# Patient Record
Sex: FEMALE | Race: WHITE | NOT HISPANIC OR LATINO | Employment: OTHER | URBAN - METROPOLITAN AREA
[De-identification: names, ages, dates, MRNs, and addresses within clinical notes are randomized per-mention and may not be internally consistent; named-entity substitution may affect disease eponyms.]

---

## 2018-11-08 ENCOUNTER — HOSPITAL ENCOUNTER (OUTPATIENT)
Dept: GENERAL RADIOLOGY | Facility: HOSPITAL | Age: 68
Discharge: HOME | End: 2018-11-08
Attending: PHYSICAL MEDICINE & REHABILITATION | Admitting: PHYSICAL MEDICINE & REHABILITATION

## 2019-01-07 ENCOUNTER — OFFICE (AMBULATORY)
Dept: URBAN - METROPOLITAN AREA CLINIC 64 | Facility: CLINIC | Age: 69
End: 2019-01-07
Payer: MEDICARE

## 2019-01-07 VITALS
HEART RATE: 71 BPM | HEIGHT: 63 IN | SYSTOLIC BLOOD PRESSURE: 120 MMHG | DIASTOLIC BLOOD PRESSURE: 70 MMHG | WEIGHT: 145 LBS

## 2019-01-07 DIAGNOSIS — K30 FUNCTIONAL DYSPEPSIA: ICD-10-CM

## 2019-01-07 DIAGNOSIS — R13.10 DYSPHAGIA, UNSPECIFIED: ICD-10-CM

## 2019-01-07 DIAGNOSIS — Z79.02 LONG TERM (CURRENT) USE OF ANTITHROMBOTICS/ANTIPLATELETS: ICD-10-CM

## 2019-01-07 PROCEDURE — 99244 OFF/OP CNSLTJ NEW/EST MOD 40: CPT | Performed by: NURSE PRACTITIONER

## 2019-01-07 PROCEDURE — 99204 OFFICE O/P NEW MOD 45 MIN: CPT | Performed by: NURSE PRACTITIONER

## 2019-01-10 ENCOUNTER — HOSPITAL ENCOUNTER (OUTPATIENT)
Dept: PAIN MEDICINE | Facility: HOSPITAL | Age: 69
Discharge: HOME | End: 2019-01-10
Attending: ANESTHESIOLOGY | Admitting: ANESTHESIOLOGY

## 2019-01-10 LAB
AMPHETAMINES UR QL SCN: NEGATIVE
BARBITURATES UR QL SCN: NEGATIVE
BENZODIAZ UR QL SCN: NEGATIVE
BZE UR QL SCN: NEGATIVE
CREAT 24H UR-MCNC: NORMAL MG/DL
METHADONE UR QL SCN: NEGATIVE
OPIATE CONFIRMATION URINE: NORMAL
OPIATES TESTED UR SCN: NEGATIVE
PCP UR QL: NEGATIVE
THC SERPLBLD CFM-MCNC: NEGATIVE NG/ML

## 2019-01-24 ENCOUNTER — HOSPITAL ENCOUNTER (OUTPATIENT)
Dept: PAIN MEDICINE | Facility: HOSPITAL | Age: 69
Discharge: HOME | End: 2019-01-24
Attending: ANESTHESIOLOGY | Admitting: ANESTHESIOLOGY

## 2019-02-26 ENCOUNTER — HOSPITAL ENCOUNTER (OUTPATIENT)
Dept: GENERAL RADIOLOGY | Facility: HOSPITAL | Age: 69
Discharge: HOME | End: 2019-02-26
Attending: ANESTHESIOLOGY | Admitting: ANESTHESIOLOGY

## 2019-03-19 ENCOUNTER — ON CAMPUS - OUTPATIENT (AMBULATORY)
Dept: URBAN - METROPOLITAN AREA HOSPITAL 85 | Facility: HOSPITAL | Age: 69
End: 2019-03-19
Payer: MEDICARE

## 2019-03-19 ENCOUNTER — HOSPITAL ENCOUNTER (OUTPATIENT)
Dept: GASTROENTEROLOGY | Facility: HOSPITAL | Age: 69
Setting detail: HOSPITAL OUTPATIENT SURGERY
Discharge: HOME | End: 2019-03-19
Attending: INTERNAL MEDICINE | Admitting: INTERNAL MEDICINE

## 2019-03-19 DIAGNOSIS — R13.10 DYSPHAGIA, UNSPECIFIED: ICD-10-CM

## 2019-03-19 DIAGNOSIS — K29.50 UNSPECIFIED CHRONIC GASTRITIS WITHOUT BLEEDING: ICD-10-CM

## 2019-03-19 DIAGNOSIS — B96.81 HELICOBACTER PYLORI [H. PYLORI] AS THE CAUSE OF DISEASES CLA: ICD-10-CM

## 2019-03-19 DIAGNOSIS — R63.4 ABNORMAL WEIGHT LOSS: ICD-10-CM

## 2019-03-19 LAB — GLUCOSE BLD-MCNC: 98 MG/DL (ref 70–105)

## 2019-03-19 PROCEDURE — 43239 EGD BIOPSY SINGLE/MULTIPLE: CPT | Performed by: INTERNAL MEDICINE

## 2019-03-19 PROCEDURE — 43450 DILATE ESOPHAGUS 1/MULT PASS: CPT | Performed by: INTERNAL MEDICINE

## 2019-03-21 ENCOUNTER — HOSPITAL ENCOUNTER (OUTPATIENT)
Dept: PAIN MEDICINE | Facility: HOSPITAL | Age: 69
Discharge: HOME | End: 2019-03-21
Attending: ANESTHESIOLOGY | Admitting: ANESTHESIOLOGY

## 2019-03-21 LAB
AMPHETAMINES UR QL SCN: NEGATIVE
BARBITURATES UR QL SCN: ABNORMAL
BENZODIAZ UR QL SCN: NEGATIVE
BZE UR QL SCN: NEGATIVE
CREAT 24H UR-MCNC: ABNORMAL MG/DL
METHADONE UR QL SCN: NEGATIVE
OPIATE CONFIRMATION URINE: ABNORMAL
OPIATES TESTED UR SCN: NEGATIVE
PCP UR QL: NEGATIVE
THC SERPLBLD CFM-MCNC: NEGATIVE NG/ML

## 2019-05-21 ENCOUNTER — HOSPITAL ENCOUNTER (OUTPATIENT)
Dept: PAIN MEDICINE | Facility: HOSPITAL | Age: 69
Discharge: HOME | End: 2019-05-21
Attending: ANESTHESIOLOGY | Admitting: ANESTHESIOLOGY

## 2019-07-18 ENCOUNTER — OFFICE VISIT (OUTPATIENT)
Dept: PAIN MEDICINE | Facility: CLINIC | Age: 69
End: 2019-07-18

## 2019-07-18 VITALS
SYSTOLIC BLOOD PRESSURE: 146 MMHG | RESPIRATION RATE: 16 BRPM | TEMPERATURE: 98.6 F | BODY MASS INDEX: 25.34 KG/M2 | DIASTOLIC BLOOD PRESSURE: 74 MMHG | HEIGHT: 63 IN | WEIGHT: 143 LBS | HEART RATE: 67 BPM | OXYGEN SATURATION: 99 %

## 2019-07-18 DIAGNOSIS — M25.50 POLYARTHRALGIA: ICD-10-CM

## 2019-07-18 DIAGNOSIS — F19.90 CURRENT DRUG USE: Primary | ICD-10-CM

## 2019-07-18 DIAGNOSIS — M79.18 MYOFASCIAL PAIN SYNDROME: ICD-10-CM

## 2019-07-18 DIAGNOSIS — Z79.899 HIGH RISK MEDICATION USE: ICD-10-CM

## 2019-07-18 DIAGNOSIS — G89.4 CHRONIC PAIN SYNDROME: Primary | ICD-10-CM

## 2019-07-18 DIAGNOSIS — M96.1 POSTLAMINECTOMY SYNDROME OF LUMBAR REGION: ICD-10-CM

## 2019-07-18 PROCEDURE — G0463 HOSPITAL OUTPT CLINIC VISIT: HCPCS | Performed by: ANESTHESIOLOGY

## 2019-07-18 PROCEDURE — 99214 OFFICE O/P EST MOD 30 MIN: CPT | Performed by: ANESTHESIOLOGY

## 2019-07-18 RX ORDER — VENLAFAXINE HYDROCHLORIDE 75 MG/1
75 CAPSULE, EXTENDED RELEASE ORAL EVERY MORNING
Refills: 0 | COMMUNITY
Start: 2019-06-27

## 2019-07-18 RX ORDER — PROMETHAZINE HYDROCHLORIDE 25 MG/1
TABLET ORAL
COMMUNITY
Start: 2013-10-10 | End: 2019-10-17 | Stop reason: ALTCHOICE

## 2019-07-18 RX ORDER — HYDROCODONE BITARTRATE AND ACETAMINOPHEN 7.5; 325 MG/1; MG/1
1 TABLET ORAL 2 TIMES DAILY PRN
Qty: 60 TABLET | Refills: 0 | Status: SHIPPED | OUTPATIENT
Start: 2019-07-18 | End: 2019-07-18 | Stop reason: SDUPTHER

## 2019-07-18 RX ORDER — AMPICILLIN TRIHYDRATE 250 MG
1 CAPSULE ORAL EVERY 24 HOURS
COMMUNITY
Start: 2019-01-10

## 2019-07-18 RX ORDER — LEVOTHYROXINE SODIUM 0.12 MG/1
125 TABLET ORAL DAILY
COMMUNITY
End: 2019-10-17 | Stop reason: ALTCHOICE

## 2019-07-18 RX ORDER — HYDROCODONE BITARTRATE AND ACETAMINOPHEN 7.5; 325 MG/1; MG/1
TABLET ORAL
Refills: 0 | COMMUNITY
Start: 2019-06-21 | End: 2019-07-18 | Stop reason: SDUPTHER

## 2019-07-18 RX ORDER — CLOPIDOGREL BISULFATE 75 MG/1
75 TABLET ORAL DAILY
COMMUNITY
Start: 2019-01-10

## 2019-07-18 RX ORDER — GALANTAMINE HYDROBROMIDE 4 MG/1
4 TABLET, FILM COATED ORAL 2 TIMES DAILY
Refills: 0 | COMMUNITY
Start: 2019-05-30 | End: 2021-06-10

## 2019-07-18 RX ORDER — ERGOCALCIFEROL (VITAMIN D2) 10 MCG
1 TABLET ORAL DAILY
COMMUNITY
Start: 2019-01-10 | End: 2022-04-14

## 2019-07-18 RX ORDER — GABAPENTIN 300 MG/1
CAPSULE ORAL
Refills: 3 | COMMUNITY
Start: 2019-05-13 | End: 2019-10-17 | Stop reason: ALTCHOICE

## 2019-07-18 RX ORDER — TOPIRAMATE 100 MG/1
100 TABLET, FILM COATED ORAL
COMMUNITY
Start: 2013-10-10 | End: 2022-04-14

## 2019-07-18 RX ORDER — HYDROCODONE BITARTRATE AND ACETAMINOPHEN 7.5; 325 MG/1; MG/1
1 TABLET ORAL 2 TIMES DAILY PRN
Qty: 60 TABLET | Refills: 0 | Status: SHIPPED | OUTPATIENT
Start: 2019-07-18 | End: 2019-10-17 | Stop reason: SDUPTHER

## 2019-07-18 RX ORDER — METFORMIN HYDROCHLORIDE EXTENDED-RELEASE TABLETS 1000 MG/1
TABLET, FILM COATED, EXTENDED RELEASE ORAL
COMMUNITY
Start: 2019-01-10 | End: 2019-10-17 | Stop reason: ALTCHOICE

## 2019-07-18 RX ORDER — BUTALBITAL, ACETAMINOPHEN AND CAFFEINE 50; 325; 40 MG/1; MG/1; MG/1
CAPSULE ORAL
Refills: 2 | COMMUNITY
Start: 2019-06-19

## 2019-07-18 RX ORDER — LAMOTRIGINE 100 MG/1
100 TABLET ORAL 2 TIMES DAILY
Refills: 0 | COMMUNITY
Start: 2019-06-27

## 2019-07-18 RX ORDER — EVOLOCUMAB 140 MG/ML
140 INJECTION, SOLUTION SUBCUTANEOUS
COMMUNITY
Start: 2019-04-11

## 2019-07-18 RX ORDER — TRAZODONE HYDROCHLORIDE 100 MG/1
TABLET ORAL
Refills: 0 | COMMUNITY
Start: 2019-06-27 | End: 2022-04-14

## 2019-07-18 NOTE — PROGRESS NOTES
CC Bilateral  knee pain and  all over pain,    68-year-old female with multiple comorbidity including several TIAs/ CVAs, on Plavix, polyarthralgia, chronic right shoulder pain, back pain S/P L4-L5 fusion 30 years ago, here for f/u.   Recent diagnosis of dementia, completed neuropsych evaluation is follow-up next week.  Hydrocodone changed to twice daily continue to have good relief of functional benefits.  Chronic  polyarthralgia especially right shoulder pain, chronic back pain, generalized myofascial pain worse with any activity.  Denies new weakness saddle anesthesia bladder bowel incontinence.    Chronic pain interfering with her participation in physical therapy / rehab after stroke.    Seen Ortho for right shoulder had steroid injection without relief, not surgical candidate.   continued chronic migraine sees  neurology/ taking Fioricet daily.       Utilizes hydrocodone diclofenac with good relief. Denies side effects     right shoulder MRI partial thickness defect on supraspinatus and unchange from prior studies.  Degenerative changes  L-spine Xray 2019: Degenerative changes and postsurgical changes. No acute fracture or dislocation.    Pain Assessment   Location of Pain: R Shoulder, Upper Back, Lower Back, R Hip, L Hip, R Leg, L Leg, R Knee, L Knee  Description of Pain: Dull/Aching, Throbbing, Stabbing  Previous Pain Rating : 5  Current Pain Ratin  Aggravating Factors: Activity  Alleviating Factors: Rest, Medication  Previous Treatments: Narcotic Pain Medication, Physical Therapy  Previous Diagnostic Studies: X-Ray      Past Medical History:     Diabetes, Type 2     Hyperlipidemia     Hypothyroidism     ADD     julisa- cpap     anxciety     arthritis     TIA     Migraine HA    Past Surgical History:     Reviewed :        back surgery- fusion L4-5        Carpal tunnel rt hand        tubal         cryo surgery rt eye        EGD 3/19/19    Social History     Tobacco Use   • Smoking status: Not on file  "  Substance Use Topics   • Alcohol use: Not on file       Review of Systems     General       Denies fever/chills, fatigue and sleep problems.    Eyes       Denies blurry vision and double vision.    ENT       Denies decreased hearing, sore throat and ears ringing.    CV       Denies chest pain and fainting.    Resp       Denies shortness of breath and cough.    GI       Denies heartburn, constipation, nausea, vomiting and diarrhea.           Denies pain on urination, incontinence and increased frequency.    MS         Right shoulder pain, multiple joint pain, back pain    Derm       Denies rash and itching.    Neuro       Denies numbness, tingling, loss of balance and history of seizures.    Psych       Denies anxiety and depression.    Endo       Denies weight change and thirsty all the time.    Heme       Denies easy bruising, bleeding and enlarged lymph nodes.    Vitals:    07/18/19 1037   BP: 146/74   Pulse: 67   Resp: 16   Temp: 98.6 °F (37 °C)   SpO2: 99%   Weight: 64.9 kg (143 lb)   Height: 160 cm (63\")       Physical Exam   General  General appearance:  no acute distress  Gait and station: antalgic  Comment: walker    Mental Status Exam   Mental Status: AAO x3  Behavior: Appropriate    Cervical   ROM decreased w/ lateral rotation  Spurling's Test Negative  Palpation: Tender  Paracervical    Thoracolumbar   ROM: decreased rotation/extension  Juan David's Test: Negative  Straight Leg Raise: Negative  Radiculopathy: Right  Palpation: Tender  Facets, Paravertebral    Muscle Stretch Reflex   Sensory Intact to light touch/pin prick    Neuro   Reflexes: R Arm 2+  L arm 2+  R Leg 2+  L Leg 2+    Strength: Arms Normal  Strength: Legs Normal      Eyes:      Clear conjunctivae,      Pupils rounds and reactive  ENT:      Clear oropharynx,       Mucosa moist/pink       Nose: no deformity  Chest Wall:      Non tender      No deformities or masses noted.    Respiratory:      Clear bilaterally to auscultation.        No " dyspnea  Heart:      Regular rate and rhythm, no murmurs, rubs, or gallops   Pulses:      Pulses 2+, symmetric  Extremities:      No clubbing, cyanosis, edema, or deformity noted   Neurologic:      No focal deficits      Coordination intact with rapid alternating movement.  Skin:      Intact without lesions or rashes.  No mass  Cervical Nodes:      No adenopathy noted.      Global pain  scale and PHQ-9 on chart.                                  Opioid risk tool low risk      Assessment /Plan  Teri was seen today for back pain and pain.    Diagnoses and all orders for this visit:    Chronic pain syndrome    Postlaminectomy syndrome of lumbar region    Polyarthralgia    High risk medication use    Myofascial pain syndrome    Other orders  -     Discontinue: HYDROcodone-acetaminophen (NORCO) 7.5-325 MG per tablet; Take 1 tablet by mouth 2 (Two) Times a Day As Needed for Moderate Pain .  -     Discontinue: HYDROcodone-acetaminophen (NORCO) 7.5-325 MG per tablet; Take 1 tablet by mouth 2 (Two) Times a Day As Needed for Moderate Pain .  -     HYDROcodone-acetaminophen (NORCO) 7.5-325 MG per tablet; Take 1 tablet by mouth 2 (Two) Times a Day As Needed for Moderate Pain .      Summary   68-year-old female with multiple comorbidity including several TIAs/ CVAs, on Plavix, polyarthralgia, chronic right shoulder pain, back pain status post L4-L5 fusion 30 years ago, here for f/u.   Chronic polyarthralgia/right shoulder pain, chronic back pain and generalized myofascial pain.     chronic migraine on Fioricet, seeing neuro- headache next month      Cont.  hydrocodone 7.5/325 2 times daily as needed for severe pain.  UDS and  Inspect reviewed.   Discussed risk of tolerance, dependence, respiratory depression, coma and death associated with use of oral opioids for treatment of chronic nonmalignant pain.        RTC in 3 mo       No

## 2019-09-03 ENCOUNTER — TELEPHONE (OUTPATIENT)
Dept: PAIN MEDICINE | Facility: CLINIC | Age: 69
End: 2019-09-03

## 2019-10-17 ENCOUNTER — OFFICE VISIT (OUTPATIENT)
Dept: PAIN MEDICINE | Facility: CLINIC | Age: 69
End: 2019-10-17

## 2019-10-17 VITALS
RESPIRATION RATE: 16 BRPM | OXYGEN SATURATION: 97 % | BODY MASS INDEX: 25.52 KG/M2 | HEART RATE: 70 BPM | DIASTOLIC BLOOD PRESSURE: 73 MMHG | HEIGHT: 63 IN | SYSTOLIC BLOOD PRESSURE: 121 MMHG | WEIGHT: 144 LBS | TEMPERATURE: 98.2 F

## 2019-10-17 DIAGNOSIS — M96.1 POSTLAMINECTOMY SYNDROME OF LUMBAR REGION: ICD-10-CM

## 2019-10-17 DIAGNOSIS — G89.4 CHRONIC PAIN SYNDROME: Primary | ICD-10-CM

## 2019-10-17 DIAGNOSIS — Z79.899 HIGH RISK MEDICATION USE: ICD-10-CM

## 2019-10-17 DIAGNOSIS — M25.50 POLYARTHRALGIA: ICD-10-CM

## 2019-10-17 DIAGNOSIS — M47.817 LUMBOSACRAL SPONDYLOSIS WITHOUT MYELOPATHY: ICD-10-CM

## 2019-10-17 PROCEDURE — G0463 HOSPITAL OUTPT CLINIC VISIT: HCPCS | Performed by: ANESTHESIOLOGY

## 2019-10-17 PROCEDURE — 99214 OFFICE O/P EST MOD 30 MIN: CPT | Performed by: ANESTHESIOLOGY

## 2019-10-17 RX ORDER — PANTOPRAZOLE SODIUM 20 MG/1
TABLET, DELAYED RELEASE ORAL
Refills: 3 | COMMUNITY
Start: 2019-07-19 | End: 2022-04-14

## 2019-10-17 RX ORDER — HYDROCODONE BITARTRATE AND ACETAMINOPHEN 7.5; 325 MG/1; MG/1
1 TABLET ORAL 2 TIMES DAILY PRN
Qty: 60 TABLET | Refills: 0 | Status: SHIPPED | OUTPATIENT
Start: 2019-10-17 | End: 2021-06-10

## 2019-10-17 RX ORDER — HYDROCODONE BITARTRATE AND ACETAMINOPHEN 7.5; 325 MG/1; MG/1
1 TABLET ORAL 2 TIMES DAILY PRN
Qty: 60 TABLET | Refills: 0 | Status: SHIPPED | OUTPATIENT
Start: 2019-10-17 | End: 2019-10-17 | Stop reason: SDUPTHER

## 2019-10-17 RX ORDER — LEVOTHYROXINE SODIUM 112 UG/1
112 TABLET ORAL DAILY
Refills: 0 | COMMUNITY
Start: 2019-08-07 | End: 2021-06-10

## 2019-10-17 RX ORDER — ONDANSETRON 4 MG/1
TABLET, FILM COATED ORAL
Refills: 1 | COMMUNITY
Start: 2019-09-23 | End: 2022-04-14

## 2019-10-17 RX ORDER — MEMANTINE HYDROCHLORIDE 10 MG/1
10 TABLET ORAL 2 TIMES DAILY
Refills: 5 | COMMUNITY
Start: 2019-09-13

## 2019-10-17 NOTE — PROGRESS NOTES
CC Bilateral  knee pain and  all over pain,    69-year-old female with multiple comorbidity including several TIAs/ CVAs, on Plavix, polyarthralgia, chronic right shoulder pain, back pain S/P L4-L5 fusion 30 years ago, here for f/u.   Diagnosed with mixed dementia, has regular follow up with neurology at [place]  Chronic  polyarthralgia especially right shoulder pain, chronic back pain, generalized myofascial pain worse with any activity.  Denies new weakness saddle anesthesia bladder bowel incontinence.    Chronic pain interfering with her participation in physical therapy / rehab after stroke.    Seen Ortho for right shoulder had steroid injection without relief, not surgical candidate.   continued chronic migraine sees  neurology/ taking Fioricet daily.       Utilizes hydrocodone diclofenac with good relief. Denies side effects     right shoulder MRI partial thickness defect on supraspinatus and unchange from prior studies.  Degenerative changes  L-spine Xray 2019: Degenerative changes and postsurgical changes. No acute fracture or dislocation.    Pain Assessment   Location of Pain: R Shoulder, Upper Back, Lower Back, R Hip, L Hip, R Leg, L Leg, R Knee, L Knee  Description of Pain: Dull/Aching, Throbbing, Stabbing  Previous Pain Rating : 7  Current Pain Ratin  Aggravating Factors: Activity  Alleviating Factors: Rest, Medication  Previous Treatments: Narcotic Pain Medication, Physical Therapy  Previous Diagnostic Studies: X-Ray    Past Medical History:     Diabetes, Type 2     Hyperlipidemia     Hypothyroidism     ADD     julisa- cpap     anxciety     arthritis     TIA     Migraine HA    Past Surgical History:     Reviewed :        back surgery- fusion L4-5        Carpal tunnel rt hand        tubal         cryo surgery rt eye        EGD 3/19/19    Social History     Tobacco Use   • Smoking status: Never Smoker   • Smokeless tobacco: Never Used   Substance Use Topics   • Alcohol use: Yes     Comment: OCC  BEER  "    Review of Systems     General       Denies fever/chills, fatigue and sleep problems.    Eyes       Denies blurry vision and double vision.    ENT       Denies decreased hearing, sore throat and ears ringing.    CV       Denies chest pain and fainting.    Resp       Denies shortness of breath and cough.    GI       Denies heartburn, constipation, nausea, vomiting and diarrhea.           Denies pain on urination, incontinence and increased frequency.    MS         Right shoulder pain, multiple joint pain, back pain    Derm       Denies rash and itching.    Neuro       Denies numbness, tingling, loss of balance and history of seizures.    Psych       Denies anxiety and depression.    Endo       Denies weight change and thirsty all the time.    Heme       Denies easy bruising, bleeding and enlarged lymph nodes.    Vitals:    10/17/19 1117   BP: 121/73   Pulse: 70   Resp: 16   Temp: 98.2 °F (36.8 °C)   SpO2: 97%   Weight: 65.3 kg (144 lb)   Height: 160 cm (63\")     Physical Exam   General  General appearance:  no acute distress  Gait and station: antalgic  Comment: walker    Mental Status Exam   Mental Status: AAO x3  Behavior: Appropriate    Cervical   ROM decreased w/ lateral rotation  Spurling's Test Negative  Palpation: Tender  Paracervical    Thoracolumbar   ROM: decreased rotation/extension  Juan David's Test: Negative  Straight Leg Raise: Negative  Radiculopathy: Right  Palpation: Tender  Facets, Paravertebral    Muscle Stretch Reflex   Sensory Intact to light touch/pin prick    Neuro   Reflexes: R Arm 2+  L arm 2+  R Leg 2+  L Leg 2+    Strength: Arms Normal  Strength: Legs Normal      Eyes:      Clear conjunctivae,      Pupils rounds and reactive  ENT:      Clear oropharynx,       Mucosa moist/pink       Nose: no deformity  Chest Wall:      Non tender      No deformities or masses noted.    Respiratory:      Clear bilaterally to auscultation.        No dyspnea  Heart:      Regular rate and rhythm, no murmurs, " rubs, or gallops   Pulses:      Pulses 2+, symmetric  Extremities:      No clubbing, cyanosis, edema, or deformity noted   Neurologic:      No focal deficits      Coordination intact with rapid alternating movement.  Skin:      Intact without lesions or rashes.  No mass  Cervical Nodes:      No adenopathy noted.      Global pain  scale and PHQ-9 on chart.                                  Opioid risk tool low risk      Assessment /Plan  Teri was seen today for back pain, knee pain and follow-up.    Diagnoses and all orders for this visit:    Chronic pain syndrome    Lumbosacral spondylosis without myelopathy    Postlaminectomy syndrome of lumbar region    Polyarthralgia    High risk medication use    Other orders  -     Discontinue: HYDROcodone-acetaminophen (NORCO) 7.5-325 MG per tablet; Take 1 tablet by mouth 2 (Two) Times a Day As Needed for Moderate Pain .  -     HYDROcodone-acetaminophen (NORCO) 7.5-325 MG per tablet; Take 1 tablet by mouth 2 (Two) Times a Day As Needed for Moderate Pain .    Summary   69-year-old female with multiple comorbidity including several TIAs/ CVAs, on Plavix, polyarthralgia, chronic right shoulder pain, back pain status post L4-L5 fusion 30 years ago, here for f/u.   Chronic polyarthralgia/right shoulder pain, chronic back pain and generalized myofascial pain.     chronic migraine on Fioricet, seeing neuro- headache.  New diagnosis of mixed dementia, seeing neurology at [place].        Cont.  hydrocodone 7.5/325 2 times daily as needed for severe pain.  UDS and  Inspect reviewed.   Discussed risk of tolerance, dependence, respiratory depression, coma and death associated with use of oral opioids for treatment of chronic nonmalignant pain.        RTC in 2 mo

## 2019-10-31 ENCOUNTER — TELEPHONE (OUTPATIENT)
Dept: PAIN MEDICINE | Facility: CLINIC | Age: 69
End: 2019-10-31

## 2019-11-01 ENCOUNTER — TELEPHONE (OUTPATIENT)
Dept: PAIN MEDICINE | Facility: HOSPITAL | Age: 69
End: 2019-11-01

## 2019-11-01 NOTE — TELEPHONE ENCOUNTER
Patient was a pill count from yesterday. A message was left for patient and her daughter is returning the call today regarding message. How do you want to proceed with pill count?
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
## 2019-11-15 ENCOUNTER — TELEPHONE (OUTPATIENT)
Dept: PAIN MEDICINE | Facility: HOSPITAL | Age: 69
End: 2019-11-15

## 2021-03-23 ENCOUNTER — TELEPHONE (OUTPATIENT)
Dept: ORTHOPEDIC SURGERY | Facility: CLINIC | Age: 71
End: 2021-03-23

## 2021-03-23 NOTE — TELEPHONE ENCOUNTER
ATTEMPTED TO WARM TRANSFER   Caller: JOMAR BARONE   Relationship to Patient: SELF    Phone Number: [phone]  Reason for Call: PATIENT CALLED TO RESCHEDULE HER APPOINTMENT FOR 03/23/21, FOR HAMMER TOE UNABLE TO SCHEDULE UNTIL 04/12/21

## 2021-04-12 ENCOUNTER — OFFICE VISIT (OUTPATIENT)
Dept: PODIATRY | Facility: CLINIC | Age: 71
End: 2021-04-12

## 2021-04-12 VITALS
BODY MASS INDEX: 25.52 KG/M2 | DIASTOLIC BLOOD PRESSURE: 83 MMHG | HEIGHT: 63 IN | WEIGHT: 144 LBS | SYSTOLIC BLOOD PRESSURE: 138 MMHG | HEART RATE: 69 BPM

## 2021-04-12 DIAGNOSIS — M20.12 HALLUX VALGUS (ACQUIRED), LEFT FOOT: ICD-10-CM

## 2021-04-12 DIAGNOSIS — M20.21 ACQUIRED HALLUX RIGIDUS, RIGHT: ICD-10-CM

## 2021-04-12 DIAGNOSIS — M79.671 BILATERAL FOOT PAIN: Primary | ICD-10-CM

## 2021-04-12 DIAGNOSIS — M20.42 HAMMER TOE OF LEFT FOOT: ICD-10-CM

## 2021-04-12 DIAGNOSIS — M79.672 BILATERAL FOOT PAIN: Primary | ICD-10-CM

## 2021-04-12 PROCEDURE — 99203 OFFICE O/P NEW LOW 30 MIN: CPT | Performed by: PODIATRIST

## 2021-04-13 NOTE — PROGRESS NOTES
04/12/2021  Foot and Ankle Surgery - New Patient   Provider: Dr. Javier Cabrales DPM  Location: [facility]    Subjective:  Teri Hanna is a 70 y.o. female.     Chief Complaint   Patient presents with   • Left Foot - Hammer Toe       HPI: Patient is a 70-year-old female with mild dementia that presents with bilateral foot pain, left greater than right.  She presents with her daughter who states that she has been dealing with progressive issues involving the left forefoot.  She complains of pain with weightbearing activities, in particular the second toe.  She has noticed extreme pain to the dorsal aspect of the toe with certain shoes.  She also complains of progressive limitation involving the right first metatarsal phalangeal joint.  She is worried that the symptoms will progress and cause further limitation.  She is unaware of any recent injury.  She has not been evaluated but a podiatrist in the past.  She rates his symptoms an 8 out of 10 most days.    Allergies   Allergen Reactions   • Meloxicam GI Bleeding     Dr's do not want pt on this medication due to Hx of TIA   • Triptans Nausea And Vomiting and Shortness Of Breath   • Ephedrine Swelling     SWELLING       Past Medical History:   Diagnosis Date   • Arthritis    • Dementia (CMS/HCC)    • Depression    • Diabetes (CMS/HCC)    • Dizziness    • Hyperlipemia    • Knee pain, bilateral    • Low back pain    • Migraine    • Rotator cuff disorder     BILATERAL   • Shoulder pain, bilateral    • Thyroid disease    • TIA (transient ischemic attack)        Past Surgical History:   Procedure Laterality Date   • BACK SURGERY     • CARPAL TUNNEL RELEASE      RT WRIST   • RETINAL DETACHMENT SURGERY     • TUBAL ABDOMINAL LIGATION         Family History   Problem Relation Age of Onset   • Seizures Mother    • Cancer Father    • Heart disease Father        Social History     Socioeconomic History   • Marital status:      Spouse name: Not on file   •  Number of children: Not on file   • Years of education: Not on file   • Highest education level: Not on file   Tobacco Use   • Smoking status: Never Smoker   • Smokeless tobacco: Never Used   Substance and Sexual Activity   • Alcohol use: Yes     Comment: OCC  BEER   • Drug use: Defer   • Sexual activity: Defer        Current Outpatient Medications on File Prior to Visit   Medication Sig Dispense Refill   • Butalbital-APAP-Caffeine -40 MG per capsule   2   • calcium citrate-vitamin d (CALCITRATE) 315-250 MG-UNIT tablet tablet Take  by mouth.     • Cholecalciferol (VITAMIN D) 1000 units tablet Take 2,000 Units by mouth Daily.     • clopidogrel (PLAVIX) 75 MG tablet Take 75 mg by mouth Daily.     • Coenzyme Q10 (COQ10) 200 MG capsule 1 capsule Daily.     • Cyanocobalamin (B-12 COMPLIANCE INJECTION) 1000 MCG/ML kit B-12 INJECTION     • diclofenac (VOLTAREN) 1 % gel gel DICLOFENAC SODIUM 1 % GEL     • galantamine (RAZADYNE) 4 MG tablet Take 4 mg by mouth 2 (Two) Times a Day.  0   • HYDROcodone-acetaminophen (NORCO) 7.5-325 MG per tablet Take 1 tablet by mouth 2 (Two) Times a Day As Needed for Moderate Pain . 60 tablet 0   • lamoTRIgine (LaMICtal) 100 MG tablet Take 100 mg by mouth 2 (Two) Times a Day.  0   • levothyroxine (SYNTHROID, LEVOTHROID) 112 MCG tablet Take 112 mcg by mouth Daily.  0   • memantine (NAMENDA) 10 MG tablet Take 10 mg by mouth 2 (Two) Times a Day.  5   • metFORMIN (GLUCOPHAGE) 1000 MG tablet Take 1,000 mg by mouth 2 (Two) Times a Day.  3   • Multiple Vitamin (DAILY VALUE MULTIVITAMIN) tablet DAILY VALUE MULTIVITAMIN TABS     • ondansetron (ZOFRAN) 4 MG tablet TK 1 T PO Q 6 TO 8 H PRF NAUSEA OR VOM  1   • pantoprazole (PROTONIX) 20 MG EC tablet TK 1 T PO QAM 30 MIN AC  3   • REPATHA SURECLICK 140 MG/ML solution auto-injector      • topiramate (TOPAMAX) 100 MG tablet Take 100 mg by mouth.     • traZODone (DESYREL) 100 MG tablet TK 1 T PO QHS  0   • venlafaxine XR (EFFEXOR-XR) 75 MG 24 hr capsule  "TK ONE C PO QD  0     No current facility-administered medications on file prior to visit.       Review of Systems:  General: Denies fever, chills, fatigue, and weakness.  Eyes: Denies vision loss, blurry vision, and excessive redness.  ENT: Denies hearing issues and difficulty swallowing.  Cardiovascular: Denies palpitations, chest pain, or syncopal episodes.  Respiratory: Denies shortness of breath, wheezing, and coughing.  GI: Denies abdominal pain, nausea, and vomiting.   : Denies frequency, hematuria, and urgency.  Musculoskeletal: + Bilateral foot pain  Derm: Denies rash, open wounds, or suspicious lesions.  Neuro: Denies headaches, numbness, loss of coordination, and tremors.  Psych: Denies anxiety and depression.  Endocrine: Denies temperature intolerance and changes in appetite.  Heme: Denies bleeding disorders or abnormal bruising.     Objective   /83   Pulse 69   Ht 160 cm (63\")   Wt 65.3 kg (144 lb)   BMI 25.51 kg/m²     Foot/Ankle Exam:       General:   Appearance: appears stated age and healthy    Orientation: AAOx3    Affect: appropriate    Gait: antalgic      VASCULAR      Right Foot Vascularity   Normal vascular exam    Dorsalis pedis:  2+  Posterior tibial:  2+  Skin Temperature: warm    Edema Grading:  None  CFT:  < 3 seconds  Pedal Hair Growth:  Present  Varicosities: none       Left Foot Vascularity   Normal vascular exam    Dorsalis pedis:  2+  Posterior tibial:  2+  Skin Temperature: warm    Edema Grading:  None  CFT:  < 3 seconds  Pedal Hair Growth:  Present  Varicosities: none        NEUROLOGIC     Right Foot Neurologic   Light touch sensation:  Normal  Hot/Cold sensation: normal    Achilles reflex:  2+     Left Foot Neurologic   Light touch sensation:  Normal  Hot/cold sensation: normal    Achilles reflex:  2+     MUSCULOSKELETAL      Right Foot Musculoskeletal   Ecchymosis:  None  Tenderness: great toe metatarsophalangeal joint    Arch:  Pes planus  Hallux limitus: Yes       " Left Foot Musculoskeletal   Ecchymosis:  None  Tenderness: toe 2 and 2nd MTJP    Arch:  Pes planus  Hammertoe:  Second toe, third toe, fourth toe and fifth toe  Hallux valgus: Yes       MUSCLE STRENGTH     Right Foot Muscle Strength   Normal strength    Foot dorsiflexion:  5  Foot plantar flexion:  5  Foot inversion:  5  Foot eversion:  5     Left Foot Muscle Strength   Normal strength    Foot dorsiflexion:  5  Foot plantar flexion:  5  Foot inversion:  5  Foot eversion:  5     DERMATOLOGIC     Right Foot Dermatologic   Skin: skin intact and atrophic       Left Foot Dermatologic   Skin: skin intact and atrophic    Skin: no left foot redness and no left foot ulcer       TESTS     Right Foot Tests   Anterior drawer: negative    Varus tilt: negative       Left Foot Tests   Anterior drawer: negative    Varus tilt: negative        Right Foot Additional Comments Mild discomfort with palpation to the right great toe joint as well as the left second digit.      Left Foot Additional Comments: Moderate deformity as described above.      Assessment/Plan   Diagnoses and all orders for this visit:    1. Bilateral foot pain (Primary)    2. Acquired hallux rigidus, right    3. Hallux valgus (acquired), left foot    4. Hammer toe of left foot      Patient is a pleasant 70-year-old female that presents with her daughter for evaluation of bilateral foot pain.  Patient states that the left is worse than the right.  She complains of progressive deformity involving the forefoot.  She also complains of limitation involving the right great toe joint.  On evaluation, her symptoms are consistent with a hammer digit deformity on the second digit.  This is her primary area of complaint.  She has no concerning features at this time but does have a rigid hammer digit deformity.  She has not tried any conservative treatments.  I have recommended that she wear appropriate shoes and toe spacers/sleeves to protect the second digit.  We did discuss  supports and mesh shoes to help her issues.  I would like her to monitor closely and return in 4 weeks for reevaluation and imaging of both feet.    No orders of the defined types were placed in this encounter.       Note is dictated utilizing voice recognition software. Unfortunately this leads to occasional typographical errors. I apologize in advance if the situation occurs. If questions occur please do not hesitate to call our office.

## 2021-05-10 ENCOUNTER — OFFICE (AMBULATORY)
Dept: URBAN - METROPOLITAN AREA CLINIC 64 | Facility: CLINIC | Age: 71
End: 2021-05-10

## 2021-05-10 VITALS
HEART RATE: 73 BPM | SYSTOLIC BLOOD PRESSURE: 126 MMHG | WEIGHT: 125 LBS | DIASTOLIC BLOOD PRESSURE: 81 MMHG | HEIGHT: 63 IN

## 2021-05-10 DIAGNOSIS — R13.10 DYSPHAGIA, UNSPECIFIED: ICD-10-CM

## 2021-05-10 DIAGNOSIS — R11.0 NAUSEA: ICD-10-CM

## 2021-05-10 PROCEDURE — 99214 OFFICE O/P EST MOD 30 MIN: CPT | Performed by: INTERNAL MEDICINE

## 2021-05-10 RX ORDER — ONDANSETRON 4 MG/1
12 TABLET, ORALLY DISINTEGRATING ORAL
Qty: 60 | Refills: 6 | Status: ACTIVE

## 2021-05-10 RX ORDER — PROMETHAZINE HYDROCHLORIDE 12.5 MG/1
TABLET ORAL
Qty: 60 | Refills: 6 | Status: ACTIVE

## 2021-05-12 ENCOUNTER — ON CAMPUS - OUTPATIENT (AMBULATORY)
Dept: URBAN - METROPOLITAN AREA HOSPITAL 2 | Facility: HOSPITAL | Age: 71
End: 2021-05-12
Payer: MEDICARE

## 2021-05-12 VITALS
DIASTOLIC BLOOD PRESSURE: 94 MMHG | SYSTOLIC BLOOD PRESSURE: 142 MMHG | WEIGHT: 123 LBS | RESPIRATION RATE: 17 BRPM | DIASTOLIC BLOOD PRESSURE: 60 MMHG | OXYGEN SATURATION: 93 % | HEART RATE: 65 BPM | SYSTOLIC BLOOD PRESSURE: 144 MMHG | OXYGEN SATURATION: 99 % | HEART RATE: 70 BPM | HEART RATE: 66 BPM | OXYGEN SATURATION: 95 % | OXYGEN SATURATION: 96 % | RESPIRATION RATE: 18 BRPM | SYSTOLIC BLOOD PRESSURE: 106 MMHG | TEMPERATURE: 97.2 F | DIASTOLIC BLOOD PRESSURE: 76 MMHG | HEART RATE: 72 BPM | DIASTOLIC BLOOD PRESSURE: 62 MMHG | SYSTOLIC BLOOD PRESSURE: 97 MMHG | RESPIRATION RATE: 16 BRPM | HEIGHT: 63 IN

## 2021-05-12 DIAGNOSIS — K44.9 DIAPHRAGMATIC HERNIA WITHOUT OBSTRUCTION OR GANGRENE: ICD-10-CM

## 2021-05-12 DIAGNOSIS — R13.10 DYSPHAGIA, UNSPECIFIED: ICD-10-CM

## 2021-05-12 DIAGNOSIS — K22.2 ESOPHAGEAL OBSTRUCTION: ICD-10-CM

## 2021-05-12 PROCEDURE — 43235 EGD DIAGNOSTIC BRUSH WASH: CPT | Performed by: INTERNAL MEDICINE

## 2021-05-12 PROCEDURE — 43450 DILATE ESOPHAGUS 1/MULT PASS: CPT | Performed by: INTERNAL MEDICINE

## 2021-06-10 ENCOUNTER — OFFICE VISIT (OUTPATIENT)
Dept: PODIATRY | Facility: CLINIC | Age: 71
End: 2021-06-10

## 2021-06-10 VITALS
DIASTOLIC BLOOD PRESSURE: 63 MMHG | BODY MASS INDEX: 25.52 KG/M2 | WEIGHT: 144 LBS | HEIGHT: 63 IN | HEART RATE: 49 BPM | SYSTOLIC BLOOD PRESSURE: 138 MMHG

## 2021-06-10 DIAGNOSIS — M20.21 ACQUIRED HALLUX RIGIDUS, RIGHT: Primary | ICD-10-CM

## 2021-06-10 DIAGNOSIS — M20.42 HAMMER TOE OF LEFT FOOT: ICD-10-CM

## 2021-06-10 DIAGNOSIS — M20.12 HALLUX VALGUS (ACQUIRED), LEFT FOOT: ICD-10-CM

## 2021-06-10 DIAGNOSIS — B35.1 ONYCHOMYCOSIS: ICD-10-CM

## 2021-06-10 PROCEDURE — 99214 OFFICE O/P EST MOD 30 MIN: CPT | Performed by: PODIATRIST

## 2021-06-10 RX ORDER — FAMOTIDINE 20 MG/1
20 TABLET, FILM COATED ORAL 2 TIMES DAILY
COMMUNITY
Start: 2021-04-29

## 2021-06-10 RX ORDER — OMEPRAZOLE 40 MG/1
40 CAPSULE, DELAYED RELEASE ORAL DAILY
COMMUNITY
Start: 2021-04-30 | End: 2022-04-14

## 2021-06-10 RX ORDER — ALBUTEROL SULFATE 90 UG/1
2 AEROSOL, METERED RESPIRATORY (INHALATION) EVERY 4 HOURS PRN
COMMUNITY
Start: 2021-05-05

## 2021-06-10 RX ORDER — UBROGEPANT 100 MG/1
100 TABLET ORAL AS NEEDED
COMMUNITY
Start: 2021-06-01

## 2021-06-10 RX ORDER — ONDANSETRON 4 MG/1
TABLET, ORALLY DISINTEGRATING ORAL
COMMUNITY
Start: 2021-05-10

## 2021-06-10 RX ORDER — CYCLOBENZAPRINE HCL 5 MG
5 TABLET ORAL NIGHTLY PRN
Qty: 30 TABLET | Refills: 0 | Status: SHIPPED | OUTPATIENT
Start: 2021-06-10 | End: 2022-04-14

## 2021-06-10 RX ORDER — GALANTAMINE HYDROBROMIDE 8 MG/1
4 TABLET, FILM COATED ORAL 2 TIMES DAILY
COMMUNITY
Start: 2021-05-25

## 2021-06-10 RX ORDER — HYDROCODONE BITARTRATE AND ACETAMINOPHEN 5; 325 MG/1; MG/1
1 TABLET ORAL EVERY 6 HOURS PRN
COMMUNITY
Start: 2021-05-05 | End: 2021-06-28 | Stop reason: HOSPADM

## 2021-06-10 RX ORDER — PROMETHAZINE HYDROCHLORIDE 12.5 MG/1
25 TABLET ORAL EVERY 8 HOURS PRN
COMMUNITY
Start: 2021-05-20

## 2021-06-10 RX ORDER — LEVOTHYROXINE SODIUM 0.12 MG/1
125 TABLET ORAL DAILY
COMMUNITY
Start: 2021-06-05 | End: 2022-01-18

## 2021-06-10 RX ORDER — PROPRANOLOL HYDROCHLORIDE 40 MG/1
40 TABLET ORAL 2 TIMES DAILY
Status: ON HOLD | COMMUNITY
Start: 2021-05-25 | End: 2022-04-25

## 2021-06-10 NOTE — PROGRESS NOTES
06/10/2021  Foot and Ankle Surgery - Established Patient/Follow-up  Provider: Dr. Javier Cabrales DPM  Location: [facility]    Subjective:  Teri Hanna is a 70 y.o. female.     Chief Complaint   Patient presents with   • Left Foot - Follow-up, Hammer Toe   • Right Foot - Follow-up, Hammer Toe       HPI: Patient returns for follow-up on her foot pain.  She has noticed mild improvement to the right foot but states that the discomfort to the left is unchanged and stable.  She has been wearing the inserts and toe spacers as directed.  She continues to have prominent discomfort involving the left forefoot and would like to discuss definitive treatment options.    Allergies   Allergen Reactions   • Meloxicam GI Bleeding     Dr's do not want pt on this medication due to Hx of TIA   • Triptans Nausea And Vomiting and Shortness Of Breath   • Ephedrine Swelling     SWELLING       Current Outpatient Medications on File Prior to Visit   Medication Sig Dispense Refill   • albuterol sulfate  (90 Base) MCG/ACT inhaler Inhale See Admin Instructions. Inhale 2 puffs by mouth every 4 to 6 hours as needed     • Butalbital-APAP-Caffeine -40 MG per capsule   2   • calcium citrate-vitamin d (CALCITRATE) 315-250 MG-UNIT tablet tablet Take  by mouth.     • Cholecalciferol (VITAMIN D) 1000 units tablet Take 2,000 Units by mouth Daily.     • clopidogrel (PLAVIX) 75 MG tablet Take 75 mg by mouth Daily.     • Coenzyme Q10 (COQ10) 200 MG capsule 1 capsule Daily.     • Cyanocobalamin (B-12 COMPLIANCE INJECTION) 1000 MCG/ML kit B-12 INJECTION     • diclofenac (VOLTAREN) 1 % gel gel DICLOFENAC SODIUM 1 % GEL     • famotidine (PEPCID) 20 MG tablet Take 20 mg by mouth 2 (Two) Times a Day.     • galantamine (RAZADYNE) 8 MG tablet Take 8 mg by mouth 2 (Two) Times a Day.     • HYDROcodone-acetaminophen (NORCO) 5-325 MG per tablet Take 1 tablet by mouth Every 6 (Six) Hours As Needed. for pain     • lamoTRIgine (LaMICtal) 100 MG  "tablet Take 100 mg by mouth 2 (Two) Times a Day.  0   • levothyroxine (SYNTHROID, LEVOTHROID) 125 MCG tablet Take 125 mcg by mouth Daily.     • memantine (NAMENDA) 10 MG tablet Take 10 mg by mouth 2 (Two) Times a Day.  5   • metFORMIN (GLUCOPHAGE) 1000 MG tablet Take 1,000 mg by mouth 2 (Two) Times a Day.  3   • Multiple Vitamin (DAILY VALUE MULTIVITAMIN) tablet DAILY VALUE MULTIVITAMIN TABS     • omeprazole (priLOSEC) 40 MG capsule Take 40 mg by mouth Daily.     • ondansetron (ZOFRAN) 4 MG tablet TK 1 T PO Q 6 TO 8 H PRF NAUSEA OR VOM  1   • ondansetron ODT (ZOFRAN-ODT) 4 MG disintegrating tablet DISSOLVE 1 TABLET ON THE TONGUE THREE TIMES DAILY AS NEEDED     • pantoprazole (PROTONIX) 20 MG EC tablet TK 1 T PO QAM 30 MIN AC  3   • promethazine (PHENERGAN) 12.5 MG tablet Take 12.5 mg by mouth Every 8 (Eight) Hours As Needed.     • propranolol (INDERAL) 40 MG tablet Take 40 mg by mouth 2 (Two) Times a Day.     • REPATHA SURECLICK 140 MG/ML solution auto-injector      • topiramate (TOPAMAX) 100 MG tablet Take 100 mg by mouth.     • traZODone (DESYREL) 100 MG tablet TK 1 T PO QHS  0   • ubrogepant 100 MG tablet TAKE 1 TABLET BY MOUTH AS NEEDED FOR HEADACHE. MAY REPEAT IN 2 HOURS IF NEEDED     • venlafaxine XR (EFFEXOR-XR) 75 MG 24 hr capsule TK ONE C PO QD  0   • [DISCONTINUED] galantamine (RAZADYNE) 4 MG tablet Take 4 mg by mouth 2 (Two) Times a Day.  0   • [DISCONTINUED] HYDROcodone-acetaminophen (NORCO) 7.5-325 MG per tablet Take 1 tablet by mouth 2 (Two) Times a Day As Needed for Moderate Pain . 60 tablet 0   • [DISCONTINUED] levothyroxine (SYNTHROID, LEVOTHROID) 112 MCG tablet Take 112 mcg by mouth Daily.  0     No current facility-administered medications on file prior to visit.       Objective   /63   Pulse (!) 49   Ht 160 cm (63\")   Wt 65.3 kg (144 lb)   BMI 25.51 kg/m²     General:   Appearance: appears stated age and healthy    Orientation: AAOx3    Affect: appropriate    Gait: antalgic  "      VASCULAR       Right Foot Vascularity   Normal vascular exam    Dorsalis pedis:  2+  Posterior tibial:  2+  Skin Temperature: warm    Edema Grading:  None  CFT:  < 3 seconds  Pedal Hair Growth:  Present  Varicosities: none        Left Foot Vascularity   Normal vascular exam    Dorsalis pedis:  2+  Posterior tibial:  2+  Skin Temperature: warm    Edema Grading:  None  CFT:  < 3 seconds  Pedal Hair Growth:  Present  Varicosities: none        NEUROLOGIC      Right Foot Neurologic   Light touch sensation:  Normal  Hot/Cold sensation: normal    Achilles reflex:  2+      Left Foot Neurologic   Light touch sensation:  Normal  Hot/cold sensation: normal    Achilles reflex:  2+      MUSCULOSKELETAL       Right Foot Musculoskeletal   Ecchymosis:  None  Tenderness: great toe metatarsophalangeal joint    Arch:  Pes planus  Hallux limitus: Yes        Left Foot Musculoskeletal   Ecchymosis:  None  Tenderness: toe 2 and 2nd MTJP    Arch:  Pes planus  Hammertoe:  Second toe, third toe, fourth toe and fifth toe  Hallux valgus: Yes        MUSCLE STRENGTH      Right Foot Muscle Strength   Normal strength    Foot dorsiflexion:  5  Foot plantar flexion:  5  Foot inversion:  5  Foot eversion:  5      Left Foot Muscle Strength   Normal strength    Foot dorsiflexion:  5  Foot plantar flexion:  5  Foot inversion:  5  Foot eversion:  5      DERMATOLOGIC      Right Foot Dermatologic   Skin: skin intact and atrophic        Left Foot Dermatologic   Skin: skin intact and atrophic    Skin: no left foot redness and no left foot ulcer    Nails: Thickened, dystrophic, and incurvated medial and lateral borders of the left hallux.  Mild discomfort with palpation      TESTS      Right Foot Tests   Anterior drawer: negative    Varus tilt: negative        Left Foot Tests   Anterior drawer: negative    Varus tilt: negative        Right Foot Additional Comments Mild discomfort with palpation to the right great toe joint as well as the left second  digit.      Left Foot Additional Comments: Moderate deformity as described above.    Assessment/Plan   Diagnoses and all orders for this visit:    1. Acquired hallux rigidus, right (Primary)  -     XR Foot 3+ View Bilateral    2. Hallux valgus (acquired), left foot  -     XR Foot 3+ View Bilateral  -     CBC (No Diff); Future  -     Basic Metabolic Panel; Future  -     ECG 12 Lead; Future  -     XR Chest 2 View; Future  -     ceFAZolin (ANCEF) 2 g in sodium chloride 0.9 % 100 mL IVPB  -     Case Request; Standing    3. Hammer toe of left foot  -     XR Foot 3+ View Bilateral  -     CBC (No Diff); Future  -     Basic Metabolic Panel; Future  -     ECG 12 Lead; Future  -     XR Chest 2 View; Future  -     ceFAZolin (ANCEF) 2 g in sodium chloride 0.9 % 100 mL IVPB  -     Case Request; Standing    4. Onychomycosis  -     CBC (No Diff); Future  -     Basic Metabolic Panel; Future  -     ECG 12 Lead; Future  -     XR Chest 2 View; Future  -     ceFAZolin (ANCEF) 2 g in sodium chloride 0.9 % 100 mL IVPB  -     Case Request; Standing    Other orders  -     cyclobenzaprine (FLEXERIL) 5 MG tablet; Take 1 tablet by mouth At Night As Needed for Muscle Spasms.  Dispense: 30 tablet; Refill: 0  -     Follow Anesthesia Guidelines / Standing Orders; Future  -     Obtain Informed Consent; Future  -     Provide NPO Instructions to Patient; Future  -     Chlorhexidine Skin Prep; Future  -     Follow Anesthesia Guidelines / Standing Orders; Standing  -     Clip Operative Site; Standing  -     Verify / Perform Chlorhexidine Skin Prep; Standing  -     Verify / Perform Chlorhexidine Skin Prep if Indicated (If Not Already Completed); Standing      Patient returns for evaluation of both feet, left greater than right.  Her physical exam is relatively unchanged and she continues to have prominent discomfort involving the left forefoot.  Her daughter is very concerned about her overall stability and feels that the pain is causing increased  issues.  Imaging was obtained and independently reviewed today showing moderate hallux valgus deformity with increased first intermetatarsal angle as well as prominent digital deformity involving the second toe.  I explained that her symptoms could be secondary to overall function but we did discuss the underlying previous TIAs.  We did discuss operative interventions including minimally invasive bunionectomy with second digit correction possibly including second metatarsal Weil osteotomy.  We reviewed the procedures, risks, goals, and recovery at length.  She does understand that there is no guarantee that this will improve her stability.  She does understand that she will require nonweightbearing activity after surgery.  Patient's daughter would also like to proceed with total nail avulsion and chemical matrixectomy given the discomfort she has to the toe and discoloration of the nail.  I do feel this is appropriate.  Patient would like to proceed with surgery in the near future.    Orders Placed This Encounter   Procedures   • XR Foot 3+ View Bilateral     Weight Bearing     Order Specific Question:   Reason for Exam:     Answer:   Bilateral foot pain with hammer toes left is worse RM 10 WB   • XR Chest 2 View     Standing Status:   Future     Standing Expiration Date:   6/10/2022     Order Specific Question:   Reason for Exam:     Answer:   Preop   • CBC (No Diff)     Standing Status:   Future     Standing Expiration Date:   6/10/2022     Order Specific Question:   Release to patient     Answer:   Immediate   • Basic Metabolic Panel     Standing Status:   Future     Standing Expiration Date:   6/10/2022     Order Specific Question:   Release to patient     Answer:   Immediate   • Follow Anesthesia Guidelines / Standing Orders     Standing Status:   Future   • Obtain Informed Consent     Standing Status:   Future     Order Specific Question:   Informed Consent Given For     Answer:   Minimally invasive  bunionectomy with internal fixation and hammertoe correction of the second digit with possible Weil osteotomy and hallux nail avulsion with chemical matrixectomy all to the left lower extremity   • Provide NPO Instructions to Patient     Standing Status:   Future   • Chlorhexidine Skin Prep     Chlorhexidine Skin Prep and Instructions For All Patients Having A Procedure Requiring an Outward Incision if Not Allergic. If Allergic, Give Antibacterial Skin Wipes and Instructions. Do Not Use For Facial Cases or on Any Mucus Membranes.     Standing Status:   Future   • ECG 12 Lead     Standing Status:   Future     Standing Expiration Date:   6/10/2022     Order Specific Question:   Reason for Exam:     Answer:   Preop          Note is dictated utilizing voice recognition software. Unfortunately this leads to occasional typographical errors. I apologize in advance if the situation occurs. If questions occur please do not hesitate to call our office.

## 2021-06-16 PROBLEM — B35.1 ONYCHOMYCOSIS: Status: ACTIVE | Noted: 2021-06-16

## 2021-06-16 PROBLEM — M20.42 HAMMER TOE OF LEFT FOOT: Status: ACTIVE | Noted: 2021-06-16

## 2021-06-16 PROBLEM — M20.12 HALLUX VALGUS (ACQUIRED), LEFT FOOT: Status: ACTIVE | Noted: 2021-06-16

## 2021-06-21 ENCOUNTER — LAB (OUTPATIENT)
Dept: LAB | Facility: HOSPITAL | Age: 71
End: 2021-06-21

## 2021-06-21 ENCOUNTER — HOSPITAL ENCOUNTER (OUTPATIENT)
Dept: CARDIOLOGY | Facility: HOSPITAL | Age: 71
Discharge: HOME | End: 2021-06-21

## 2021-06-21 ENCOUNTER — HOSPITAL ENCOUNTER (OUTPATIENT)
Dept: GENERAL RADIOLOGY | Facility: HOSPITAL | Age: 71
Discharge: HOME | End: 2021-06-21

## 2021-06-21 DIAGNOSIS — M20.12 HALLUX VALGUS (ACQUIRED), LEFT FOOT: ICD-10-CM

## 2021-06-21 DIAGNOSIS — B35.1 ONYCHOMYCOSIS: ICD-10-CM

## 2021-06-21 DIAGNOSIS — M20.42 HAMMER TOE OF LEFT FOOT: ICD-10-CM

## 2021-06-21 LAB
ANION GAP SERPL CALCULATED.3IONS-SCNC: 8.2 MMOL/L (ref 5–15)
BUN SERPL-MCNC: 16 MG/DL (ref 8–23)
BUN/CREAT SERPL: 16.3 (ref 7–25)
CALCIUM SPEC-SCNC: 9.6 MG/DL (ref 8.6–10.5)
CHLORIDE SERPL-SCNC: 103 MMOL/L (ref 98–107)
CO2 SERPL-SCNC: 27.8 MMOL/L (ref 22–29)
CREAT SERPL-MCNC: 0.98 MG/DL (ref 0.57–1)
DEPRECATED RDW RBC AUTO: 42.5 FL (ref 37–54)
ERYTHROCYTE [DISTWIDTH] IN BLOOD BY AUTOMATED COUNT: 12.5 % (ref 12.3–15.4)
GFR SERPL CREATININE-BSD FRML MDRD: 56 ML/MIN/1.73
GLUCOSE SERPL-MCNC: 85 MG/DL (ref 65–99)
HCT VFR BLD AUTO: 39.6 % (ref 34–46.6)
HGB BLD-MCNC: 13.2 G/DL (ref 12–15.9)
MCH RBC QN AUTO: 30.8 PG (ref 26.6–33)
MCHC RBC AUTO-ENTMCNC: 33.3 G/DL (ref 31.5–35.7)
MCV RBC AUTO: 92.3 FL (ref 79–97)
PLATELET # BLD AUTO: 275 10*3/MM3 (ref 140–450)
PMV BLD AUTO: 9.2 FL (ref 6–12)
POTASSIUM SERPL-SCNC: 4.2 MMOL/L (ref 3.5–5.2)
RBC # BLD AUTO: 4.29 10*6/MM3 (ref 3.77–5.28)
SODIUM SERPL-SCNC: 139 MMOL/L (ref 136–145)
WBC # BLD AUTO: 5.16 10*3/MM3 (ref 3.4–10.8)

## 2021-06-21 PROCEDURE — 80048 BASIC METABOLIC PNL TOTAL CA: CPT

## 2021-06-21 PROCEDURE — 71046 X-RAY EXAM CHEST 2 VIEWS: CPT

## 2021-06-21 PROCEDURE — 36415 COLL VENOUS BLD VENIPUNCTURE: CPT

## 2021-06-21 PROCEDURE — 85027 COMPLETE CBC AUTOMATED: CPT

## 2021-06-21 PROCEDURE — 93005 ELECTROCARDIOGRAM TRACING: CPT | Performed by: PODIATRIST

## 2021-06-21 PROCEDURE — 93010 ELECTROCARDIOGRAM REPORT: CPT | Performed by: INTERNAL MEDICINE

## 2021-06-25 ENCOUNTER — LAB (OUTPATIENT)
Dept: LAB | Facility: HOSPITAL | Age: 71
End: 2021-06-25

## 2021-06-25 LAB
QT INTERVAL: 416 MS
SARS-COV-2 ORF1AB RESP QL NAA+PROBE: NOT DETECTED

## 2021-06-25 PROCEDURE — U0004 COV-19 TEST NON-CDC HGH THRU: HCPCS

## 2021-06-25 PROCEDURE — C9803 HOPD COVID-19 SPEC COLLECT: HCPCS

## 2021-06-28 ENCOUNTER — APPOINTMENT (OUTPATIENT)
Dept: GENERAL RADIOLOGY | Facility: HOSPITAL | Age: 71
End: 2021-06-28

## 2021-06-28 ENCOUNTER — ANESTHESIA (OUTPATIENT)
Dept: PERIOP | Facility: HOSPITAL | Age: 71
End: 2021-06-28

## 2021-06-28 ENCOUNTER — HOSPITAL ENCOUNTER (OUTPATIENT)
Facility: HOSPITAL | Age: 71
Setting detail: HOSPITAL OUTPATIENT SURGERY
Discharge: HOME | End: 2021-06-28
Attending: PODIATRIST | Admitting: PODIATRIST

## 2021-06-28 ENCOUNTER — ANESTHESIA EVENT (OUTPATIENT)
Dept: PERIOP | Facility: HOSPITAL | Age: 71
End: 2021-06-28

## 2021-06-28 VITALS
RESPIRATION RATE: 18 BRPM | WEIGHT: 124.6 LBS | BODY MASS INDEX: 22.08 KG/M2 | SYSTOLIC BLOOD PRESSURE: 135 MMHG | OXYGEN SATURATION: 95 % | HEART RATE: 60 BPM | TEMPERATURE: 98.3 F | DIASTOLIC BLOOD PRESSURE: 58 MMHG | HEIGHT: 63 IN

## 2021-06-28 DIAGNOSIS — M20.42 HAMMER TOE OF LEFT FOOT: ICD-10-CM

## 2021-06-28 DIAGNOSIS — B35.1 ONYCHOMYCOSIS: ICD-10-CM

## 2021-06-28 DIAGNOSIS — M20.12 HALLUX VALGUS (ACQUIRED), LEFT FOOT: ICD-10-CM

## 2021-06-28 LAB
GLUCOSE BLDC GLUCOMTR-MCNC: 112 MG/DL (ref 70–105)
GLUCOSE BLDC GLUCOMTR-MCNC: 129 MG/DL (ref 70–105)

## 2021-06-28 PROCEDURE — 25010000003 LIDOCAINE 1 % SOLUTION 20 ML VIAL: Performed by: PODIATRIST

## 2021-06-28 PROCEDURE — 25010000002 FENTANYL CITRATE (PF) 50 MCG/ML SOLUTION: Performed by: ANESTHESIOLOGIST ASSISTANT

## 2021-06-28 PROCEDURE — C1713 ANCHOR/SCREW BN/BN,TIS/BN: HCPCS | Performed by: PODIATRIST

## 2021-06-28 PROCEDURE — 28296 COR HLX VLGS DSTL MTAR OSTEO: CPT | Performed by: PODIATRIST

## 2021-06-28 PROCEDURE — 76000 FLUOROSCOPY <1 HR PHYS/QHP: CPT

## 2021-06-28 PROCEDURE — 25010000002 FENTANYL CITRATE (PF) 100 MCG/2ML SOLUTION: Performed by: ANESTHESIOLOGIST ASSISTANT

## 2021-06-28 PROCEDURE — 25010000002 PROPOFOL 10 MG/ML EMULSION: Performed by: ANESTHESIOLOGIST ASSISTANT

## 2021-06-28 PROCEDURE — 82962 GLUCOSE BLOOD TEST: CPT

## 2021-06-28 PROCEDURE — 25010000002 ONDANSETRON PER 1 MG: Performed by: REGISTERED NURSE

## 2021-06-28 PROCEDURE — 28285 REPAIR OF HAMMERTOE: CPT | Performed by: PODIATRIST

## 2021-06-28 PROCEDURE — 73620 X-RAY EXAM OF FOOT: CPT

## 2021-06-28 PROCEDURE — C1776 JOINT DEVICE (IMPLANTABLE): HCPCS | Performed by: PODIATRIST

## 2021-06-28 PROCEDURE — 25010000002 DEXAMETHASONE PER 1 MG: Performed by: ANESTHESIOLOGIST ASSISTANT

## 2021-06-28 DEVICE — IMPLANTABLE DEVICE: Type: IMPLANTABLE DEVICE | Site: FOOT | Status: FUNCTIONAL

## 2021-06-28 DEVICE — IMPLANTABLE DEVICE
Type: IMPLANTABLE DEVICE | Site: FOOT | Status: FUNCTIONAL
Brand: PRO-TOE

## 2021-06-28 RX ORDER — OXYCODONE HYDROCHLORIDE 5 MG/1
7.5 TABLET ORAL ONCE AS NEEDED
Status: COMPLETED | OUTPATIENT
Start: 2021-06-28 | End: 2021-06-28

## 2021-06-28 RX ORDER — FENTANYL CITRATE 50 UG/ML
INJECTION, SOLUTION INTRAMUSCULAR; INTRAVENOUS AS NEEDED
Status: DISCONTINUED | OUTPATIENT
Start: 2021-06-28 | End: 2021-06-28 | Stop reason: SURG

## 2021-06-28 RX ORDER — SODIUM CHLORIDE, SODIUM LACTATE, POTASSIUM CHLORIDE, CALCIUM CHLORIDE 600; 310; 30; 20 MG/100ML; MG/100ML; MG/100ML; MG/100ML
1000 INJECTION, SOLUTION INTRAVENOUS CONTINUOUS
Status: DISCONTINUED | OUTPATIENT
Start: 2021-06-28 | End: 2021-06-28 | Stop reason: HOSPADM

## 2021-06-28 RX ORDER — MORPHINE SULFATE 4 MG/ML
2 INJECTION, SOLUTION INTRAMUSCULAR; INTRAVENOUS
Status: DISCONTINUED | OUTPATIENT
Start: 2021-06-28 | End: 2021-06-28 | Stop reason: HOSPADM

## 2021-06-28 RX ORDER — PROMETHAZINE HYDROCHLORIDE 25 MG/1
25 TABLET ORAL ONCE AS NEEDED
Status: DISCONTINUED | OUTPATIENT
Start: 2021-06-28 | End: 2021-06-28 | Stop reason: HOSPADM

## 2021-06-28 RX ORDER — PHENYLEPHRINE HCL IN 0.9% NACL 1 MG/10 ML
SYRINGE (ML) INTRAVENOUS AS NEEDED
Status: DISCONTINUED | OUTPATIENT
Start: 2021-06-28 | End: 2021-06-28 | Stop reason: SURG

## 2021-06-28 RX ORDER — FENTANYL CITRATE 50 UG/ML
25 INJECTION, SOLUTION INTRAMUSCULAR; INTRAVENOUS
Status: DISCONTINUED | OUTPATIENT
Start: 2021-06-28 | End: 2021-06-28 | Stop reason: HOSPADM

## 2021-06-28 RX ORDER — FLUMAZENIL 0.1 MG/ML
0.2 INJECTION INTRAVENOUS AS NEEDED
Status: DISCONTINUED | OUTPATIENT
Start: 2021-06-28 | End: 2021-06-28 | Stop reason: HOSPADM

## 2021-06-28 RX ORDER — NALOXONE HCL 0.4 MG/ML
0.4 VIAL (ML) INJECTION AS NEEDED
Status: DISCONTINUED | OUTPATIENT
Start: 2021-06-28 | End: 2021-06-28 | Stop reason: HOSPADM

## 2021-06-28 RX ORDER — MORPHINE SULFATE 4 MG/ML
4 INJECTION, SOLUTION INTRAMUSCULAR; INTRAVENOUS
Status: DISCONTINUED | OUTPATIENT
Start: 2021-06-28 | End: 2021-06-28 | Stop reason: HOSPADM

## 2021-06-28 RX ORDER — MEPERIDINE HYDROCHLORIDE 25 MG/ML
12.5 INJECTION INTRAMUSCULAR; INTRAVENOUS; SUBCUTANEOUS
Status: DISCONTINUED | OUTPATIENT
Start: 2021-06-28 | End: 2021-06-28 | Stop reason: HOSPADM

## 2021-06-28 RX ORDER — DEXAMETHASONE SODIUM PHOSPHATE 4 MG/ML
INJECTION, SOLUTION INTRA-ARTICULAR; INTRALESIONAL; INTRAMUSCULAR; INTRAVENOUS; SOFT TISSUE AS NEEDED
Status: DISCONTINUED | OUTPATIENT
Start: 2021-06-28 | End: 2021-06-28 | Stop reason: SURG

## 2021-06-28 RX ORDER — PROPOFOL 10 MG/ML
VIAL (ML) INTRAVENOUS AS NEEDED
Status: DISCONTINUED | OUTPATIENT
Start: 2021-06-28 | End: 2021-06-28 | Stop reason: SURG

## 2021-06-28 RX ORDER — IPRATROPIUM BROMIDE AND ALBUTEROL SULFATE 2.5; .5 MG/3ML; MG/3ML
3 SOLUTION RESPIRATORY (INHALATION) ONCE AS NEEDED
Status: DISCONTINUED | OUTPATIENT
Start: 2021-06-28 | End: 2021-06-28 | Stop reason: HOSPADM

## 2021-06-28 RX ORDER — LIDOCAINE HYDROCHLORIDE 10 MG/ML
INJECTION, SOLUTION EPIDURAL; INFILTRATION; INTRACAUDAL; PERINEURAL AS NEEDED
Status: DISCONTINUED | OUTPATIENT
Start: 2021-06-28 | End: 2021-06-28 | Stop reason: SURG

## 2021-06-28 RX ORDER — HYDROCODONE BITARTRATE AND ACETAMINOPHEN 7.5; 325 MG/1; MG/1
1 TABLET ORAL EVERY 6 HOURS PRN
Qty: 28 TABLET | Refills: 0 | Status: SHIPPED | OUTPATIENT
Start: 2021-06-28 | End: 2021-07-27

## 2021-06-28 RX ORDER — MORPHINE SULFATE 4 MG/ML
1 INJECTION, SOLUTION INTRAMUSCULAR; INTRAVENOUS
Status: DISCONTINUED | OUTPATIENT
Start: 2021-06-28 | End: 2021-06-28 | Stop reason: HOSPADM

## 2021-06-28 RX ORDER — ONDANSETRON 2 MG/ML
INJECTION INTRAMUSCULAR; INTRAVENOUS AS NEEDED
Status: DISCONTINUED | OUTPATIENT
Start: 2021-06-28 | End: 2021-06-28 | Stop reason: SURG

## 2021-06-28 RX ORDER — ONDANSETRON 2 MG/ML
4 INJECTION INTRAMUSCULAR; INTRAVENOUS ONCE AS NEEDED
Status: DISCONTINUED | OUTPATIENT
Start: 2021-06-28 | End: 2021-06-28 | Stop reason: HOSPADM

## 2021-06-28 RX ORDER — SODIUM CHLORIDE 0.9 % (FLUSH) 0.9 %
10 SYRINGE (ML) INJECTION AS NEEDED
Status: DISCONTINUED | OUTPATIENT
Start: 2021-06-28 | End: 2021-06-28 | Stop reason: HOSPADM

## 2021-06-28 RX ORDER — LIDOCAINE HYDROCHLORIDE 10 MG/ML
0.5 INJECTION, SOLUTION INFILTRATION; PERINEURAL ONCE AS NEEDED
Status: DISCONTINUED | OUTPATIENT
Start: 2021-06-28 | End: 2021-06-28 | Stop reason: HOSPADM

## 2021-06-28 RX ORDER — MIRTAZAPINE 15 MG/1
15 TABLET, FILM COATED ORAL
COMMUNITY
Start: 2021-06-10

## 2021-06-28 RX ADMIN — SODIUM CHLORIDE, POTASSIUM CHLORIDE, SODIUM LACTATE AND CALCIUM CHLORIDE: 600; 310; 30; 20 INJECTION, SOLUTION INTRAVENOUS at 13:13

## 2021-06-28 RX ADMIN — Medication 100 MCG: at 13:11

## 2021-06-28 RX ADMIN — FENTANYL CITRATE 25 MCG: 50 INJECTION, SOLUTION INTRAMUSCULAR; INTRAVENOUS at 14:02

## 2021-06-28 RX ADMIN — FENTANYL CITRATE 25 MCG: 50 INJECTION, SOLUTION INTRAMUSCULAR; INTRAVENOUS at 14:23

## 2021-06-28 RX ADMIN — FENTANYL CITRATE 50 MCG: 50 INJECTION, SOLUTION INTRAMUSCULAR; INTRAVENOUS at 11:44

## 2021-06-28 RX ADMIN — Medication 100 MCG: at 12:28

## 2021-06-28 RX ADMIN — FENTANYL CITRATE 25 MCG: 50 INJECTION, SOLUTION INTRAMUSCULAR; INTRAVENOUS at 14:37

## 2021-06-28 RX ADMIN — FENTANYL CITRATE 25 MCG: 50 INJECTION, SOLUTION INTRAMUSCULAR; INTRAVENOUS at 14:45

## 2021-06-28 RX ADMIN — OXYCODONE 7.5 MG: 5 TABLET ORAL at 14:46

## 2021-06-28 RX ADMIN — DEXAMETHASONE SODIUM PHOSPHATE 4 MG: 4 INJECTION, SOLUTION INTRAMUSCULAR; INTRAVENOUS at 12:01

## 2021-06-28 RX ADMIN — GLYCOPYRROLATE 0.2 MCG: 0.2 INJECTION, SOLUTION INTRAMUSCULAR; INTRAVITREAL at 11:53

## 2021-06-28 RX ADMIN — Medication 200 MCG: at 12:13

## 2021-06-28 RX ADMIN — PROPOFOL 150 MG: 10 INJECTION, EMULSION INTRAVENOUS at 11:48

## 2021-06-28 RX ADMIN — Medication 100 MCG: at 12:51

## 2021-06-28 RX ADMIN — Medication 100 MCG: at 11:59

## 2021-06-28 RX ADMIN — Medication 100 MCG: at 12:35

## 2021-06-28 RX ADMIN — Medication 100 MCG: at 11:54

## 2021-06-28 RX ADMIN — CEFAZOLIN SODIUM 2 G: 1 INJECTION, POWDER, FOR SOLUTION INTRAMUSCULAR; INTRAVENOUS at 11:52

## 2021-06-28 RX ADMIN — Medication 100 MCG: at 12:22

## 2021-06-28 RX ADMIN — ONDANSETRON 4 MG: 2 INJECTION INTRAMUSCULAR; INTRAVENOUS at 13:21

## 2021-06-28 RX ADMIN — LIDOCAINE HYDROCHLORIDE 50 MG: 10 INJECTION, SOLUTION EPIDURAL; INFILTRATION; INTRACAUDAL; PERINEURAL at 11:48

## 2021-06-28 RX ADMIN — Medication 200 MCG: at 12:40

## 2021-06-28 RX ADMIN — SODIUM CHLORIDE, POTASSIUM CHLORIDE, SODIUM LACTATE AND CALCIUM CHLORIDE 1000 ML: 600; 310; 30; 20 INJECTION, SOLUTION INTRAVENOUS at 09:58

## 2021-06-28 NOTE — ANESTHESIA POSTPROCEDURE EVALUATION
Patient: Teri Hanna    Procedure Summary     Date: 06/28/21 [facility]    Anesthesia Start: 1143 Anesthesia Stop: 1355    Procedures:       BUNIONECTOMY KARRIE MINIMALLY INVASIVE WITH INTERNAL FIXATION AND HALLUX NAIL AVULSION WITH CHEMICAL MATRIXECTOMY (Left Foot)      HAMMER TOE REPAIR SECOND TOE (Left Toes) Diagnosis:       Hallux valgus (acquired), left foot      Hammer toe of left foot      Onychomycosis      (Hallux valgus (acquired), left foot [M20.12])      (Hammer toe of left foot [M20.42])      (Onychomycosis [B35.1])    Surgeons: CAMERON Cabrales DPM Provider: Maldonado Titus MD    Anesthesia Type: general with block ASA Status: 3          Anesthesia Type: general with block    Vitals  Vitals Value Taken Time   /66 06/28/21 1451   Temp 98.6 °F (37 °C) 06/28/21 1354   Pulse 59 06/28/21 1450   Resp 17 06/28/21 1439   SpO2 99 % 06/28/21 1450   Vitals shown include unvalidated device data.        Post Anesthesia Care and Evaluation    Patient location during evaluation: PACU  Patient participation: complete - patient participated  Level of consciousness: responsive to verbal stimuli and awake  Pain scale: See nurse's notes for pain score.  Pain management: adequate  Airway patency: patent  Anesthetic complications: No anesthetic complications  PONV Status: none  Cardiovascular status: acceptable  Respiratory status: acceptable  Hydration status: acceptable    Comments: Patient seen and examined postoperatively; vital signs stable; SpO2 greater than or equal to 90%; cardiopulmonary status stable; nausea/vomiting adequately controlled; pain adequately controlled; no apparent anesthesia complications; patient discharged from anesthesia care when discharge criteria were met

## 2021-06-28 NOTE — ANESTHESIA PREPROCEDURE EVALUATION
Anesthesia Evaluation     Patient summary reviewed and Nursing notes reviewed   NPO Solid Status: > 8 hours  NPO Liquid Status: > 8 hours           Airway   Mallampati: II  TM distance: >3 FB  Neck ROM: full  No difficulty expected  Dental - normal exam     Pulmonary - negative pulmonary ROS and normal exam   Cardiovascular - normal exam    PT is on anticoagulation therapy    (+) hyperlipidemia,       Neuro/Psych  (+) TIA, dementia,     GI/Hepatic/Renal/Endo    (+)   diabetes mellitus, thyroid problem hypothyroidism    Musculoskeletal     Abdominal  - normal exam    Bowel sounds: normal.   Substance History - negative use     OB/GYN negative ob/gyn ROS         Other   arthritis,                      Anesthesia Plan    ASA 3     general with block   (Poss postop block)  intravenous induction     Anesthetic plan, all risks, benefits, and alternatives have been provided, discussed and informed consent has been obtained with: patient.    Plan discussed with CRNA and CAA.

## 2021-06-28 NOTE — ANESTHESIA PROCEDURE NOTES
Airway  Urgency: elective    Date/Time: 6/28/2021 11:49 AM  Airway not difficult    General Information and Staff    Patient location during procedure: OR  Anesthesiologist: Maldonado Titus MD  CRNA: Amirah Garcia AA    Indications and Patient Condition  Indications for airway management: CNS depression    Preoxygenated: yes  MILS maintained throughout  Mask difficulty assessment: 0 - not attempted    Final Airway Details  Final airway type: supraglottic airway      Successful airway: classic  Size 4    Number of attempts at approach: 1  Assessment: lips, teeth, and gum same as pre-op

## 2021-07-12 ENCOUNTER — OFFICE VISIT (OUTPATIENT)
Dept: PODIATRY | Facility: CLINIC | Age: 71
End: 2021-07-12

## 2021-07-12 VITALS
HEART RATE: 62 BPM | DIASTOLIC BLOOD PRESSURE: 76 MMHG | BODY MASS INDEX: 21.97 KG/M2 | SYSTOLIC BLOOD PRESSURE: 148 MMHG | WEIGHT: 124 LBS | HEIGHT: 63 IN

## 2021-07-12 DIAGNOSIS — M20.42 HAMMER TOE OF LEFT FOOT: ICD-10-CM

## 2021-07-12 DIAGNOSIS — M20.12 HALLUX VALGUS (ACQUIRED), LEFT FOOT: Primary | ICD-10-CM

## 2021-07-12 PROCEDURE — 99024 POSTOP FOLLOW-UP VISIT: CPT | Performed by: PODIATRIST

## 2021-07-12 NOTE — PROGRESS NOTES
07/12/2021  Foot and Ankle Surgery - Established Patient/Follow-up  Provider: Dr. Javier Cabrales DPM  Location: [facility]    Subjective:  Teri Hanna is a 70 y.o. female.     Chief Complaint   Patient presents with   • Left Foot - Follow-up       HPI: Patient returns 2 weeks after bunionectomy and hammertoe correction.  She states that she is doing well.  She has remained mostly off weightbearing as instructed.  She denies any prominent pain or limitation.    Allergies   Allergen Reactions   • Meloxicam GI Bleeding     Dr's do not want pt on this medication due to Hx of TIA   • Triptans Nausea And Vomiting and Shortness Of Breath   • Ephedrine Swelling     SWELLING       Current Outpatient Medications on File Prior to Visit   Medication Sig Dispense Refill   • albuterol sulfate  (90 Base) MCG/ACT inhaler Inhale 2 puffs Every 4 (Four) Hours As Needed. Inhale 2 puffs by mouth every 4 to 6 hours as needed to bring     • Butalbital-APAP-Caffeine -40 MG per capsule   2   • calcium citrate-vitamin d (CALCITRATE) 315-250 MG-UNIT tablet tablet Take 1 tablet by mouth Daily. lD 6/23     • Cholecalciferol (VITAMIN D) 1000 units tablet Take 2,000 Units by mouth Daily. LD 6/23     • clopidogrel (PLAVIX) 75 MG tablet Take 75 mg by mouth Daily.     • Coenzyme Q10 (COQ10) 200 MG capsule 1 capsule Daily.     • Cyanocobalamin (B-12 COMPLIANCE INJECTION) 1000 MCG/ML kit B-12 INJECTION     • cyclobenzaprine (FLEXERIL) 5 MG tablet Take 1 tablet by mouth At Night As Needed for Muscle Spasms. 30 tablet 0   • diclofenac (VOLTAREN) 1 % gel gel DICLOFENAC SODIUM 1 % GEL     • famotidine (PEPCID) 20 MG tablet Take 20 mg by mouth 2 (Two) Times a Day. Take dos     • galantamine (RAZADYNE) 8 MG tablet Take 4 mg by mouth 2 (Two) Times a Day. Take dos     • HYDROcodone-acetaminophen (NORCO) 7.5-325 MG per tablet Take 1 tablet by mouth Every 6 (Six) Hours As Needed for Moderate Pain  (Pain). 28 tablet 0   • lamoTRIgine  "(LaMICtal) 100 MG tablet Take 100 mg by mouth 2 (Two) Times a Day. Take dos  0   • levothyroxine (SYNTHROID, LEVOTHROID) 125 MCG tablet Take 125 mcg by mouth Daily. Take dos     • memantine (NAMENDA) 10 MG tablet Take 10 mg by mouth 2 (Two) Times a Day. Take dos  5   • metFORMIN (GLUCOPHAGE) 1000 MG tablet Take 1,000 mg by mouth 2 (Two) Times a Day. LD 6/26  3   • mirtazapine (REMERON) 15 MG tablet Take 15 mg by mouth every night at bedtime.     • Multiple Vitamin (DAILY VALUE MULTIVITAMIN) tablet 1 tablet Daily. LD 6/25     • omeprazole (priLOSEC) 40 MG capsule Take 40 mg by mouth Daily. Take dos     • ondansetron (ZOFRAN) 4 MG tablet TK 1 T PO Q 6 TO 8 H PRF NAUSEA OR VOM  1   • ondansetron ODT (ZOFRAN-ODT) 4 MG disintegrating tablet DISSOLVE 1 TABLET ON THE TONGUE THREE TIMES DAILY AS NEEDED     • pantoprazole (PROTONIX) 20 MG EC tablet TK 1 T PO QAM 30 MIN AC  3   • promethazine (PHENERGAN) 12.5 MG tablet Take 25 mg by mouth Every 8 (Eight) Hours As Needed.     • propranolol (INDERAL) 40 MG tablet Take 40 mg by mouth 2 (Two) Times a Day. Take dos     • REPATHA SURECLICK 140 MG/ML solution auto-injector Inject 140 mg under the skin into the appropriate area as directed Every 14 (Fourteen) Days.     • topiramate (TOPAMAX) 100 MG tablet Take 100 mg by mouth.     • traZODone (DESYREL) 100 MG tablet TK 1 T PO QHS  0   • ubrogepant 100 MG tablet 100 mg As Needed.     • venlafaxine XR (EFFEXOR-XR) 75 MG 24 hr capsule TK ONE C PO QD  0     No current facility-administered medications on file prior to visit.       Objective   /76   Pulse 62   Ht 160 cm (63\")   Wt 56.2 kg (124 lb)   BMI 21.97 kg/m²     General:   Appearance: appears stated age and healthy    Orientation: AAOx3    Affect: appropriate    Gait: antalgic       VASCULAR       Right Foot Vascularity   Normal vascular exam    Dorsalis pedis:  2+  Posterior tibial:  2+  Skin Temperature: warm    Edema Grading:  None  CFT:  < 3 seconds  Pedal Hair " Growth:  Present  Varicosities: none        Left Foot Vascularity   Normal vascular exam    Dorsalis pedis:  2+  Posterior tibial:  2+  Skin Temperature: warm    Edema Grading:  None  CFT:  < 3 seconds  Pedal Hair Growth:  Present  Varicosities: none        NEUROLOGIC      Right Foot Neurologic   Light touch sensation:  Normal  Hot/Cold sensation: normal    Achilles reflex:  2+      Left Foot Neurologic   Light touch sensation:  Normal  Hot/cold sensation: normal    Achilles reflex:  2+      MUSCULOSKELETAL       Right Foot Musculoskeletal   Ecchymosis:  None  Tenderness: great toe metatarsophalangeal joint    Arch:  Pes planus  Hallux limitus: Yes        Left Foot Musculoskeletal   Ecchymosis:  None  Tenderness: Mild to the operative site  Arch:  Pes planus  Hammertoe: Rectus alignment of the second toe  Hallux valgus: corrected        MUSCLE STRENGTH      Right Foot Muscle Strength   Normal strength    Foot dorsiflexion:  5  Foot plantar flexion:  5  Foot inversion:  5  Foot eversion:  5      Left Foot Muscle Strength   Normal strength    Foot dorsiflexion:  5  Foot plantar flexion:  5  Foot inversion:  5  Foot eversion:  5      DERMATOLOGIC      Right Foot Dermatologic   Skin: skin intact and atrophic        Left Foot Dermatologic   Skin: Incision sites are dry and stable no evidence of dehiscence or infection.  Pin site is clean and dry       Assessment/Plan   Diagnoses and all orders for this visit:    1. Hallux valgus (acquired), left foot (Primary)    2. Hammer toe of left foot      Patient returns 2 weeks after surgery.  She states that she is doing quite well.  We did discuss intraoperative imaging.  I have recommended that she proceed with partial protected weightbearing in the cam boot as needed.  She is to start gentle manual therapy exercises.  I would like to keep her foot clean, dry.  I would like to see her in 2 weeks for reevaluation and imaging after pin removal.    No orders of the defined types  were placed in this encounter.         Note is dictated utilizing voice recognition software. Unfortunately this leads to occasional typographical errors. I apologize in advance if the situation occurs. If questions occur please do not hesitate to call our office.

## 2021-07-27 ENCOUNTER — OFFICE VISIT (OUTPATIENT)
Dept: PODIATRY | Facility: CLINIC | Age: 71
End: 2021-07-27

## 2021-07-27 VITALS
DIASTOLIC BLOOD PRESSURE: 77 MMHG | SYSTOLIC BLOOD PRESSURE: 173 MMHG | HEART RATE: 56 BPM | WEIGHT: 124 LBS | BODY MASS INDEX: 21.97 KG/M2 | HEIGHT: 63 IN

## 2021-07-27 DIAGNOSIS — M20.42 HAMMER TOE OF LEFT FOOT: ICD-10-CM

## 2021-07-27 DIAGNOSIS — M20.12 HALLUX VALGUS (ACQUIRED), LEFT FOOT: Primary | ICD-10-CM

## 2021-07-27 PROCEDURE — 99024 POSTOP FOLLOW-UP VISIT: CPT | Performed by: PODIATRIST

## 2021-07-27 NOTE — PROGRESS NOTES
07/27/2021  Foot and Ankle Surgery - Established Patient/Follow-up  Provider: Dr. Javier Cabrales DPM  Location: [facility]    Subjective:  Teri Hanna is a 70 y.o. female.     Chief Complaint   Patient presents with   • Left Foot - Follow-up, Post-op       HPI: Patient returns for follow-up regarding her left foot.  She is 4 weeks out from surgery.  She states that she is doing quite well and has remained mostly off weightbearing.  No other issues today    Allergies   Allergen Reactions   • Meloxicam GI Bleeding     Dr's do not want pt on this medication due to Hx of TIA   • Triptans Nausea And Vomiting and Shortness Of Breath   • Ephedrine Swelling     SWELLING       Current Outpatient Medications on File Prior to Visit   Medication Sig Dispense Refill   • albuterol sulfate  (90 Base) MCG/ACT inhaler Inhale 2 puffs Every 4 (Four) Hours As Needed. Inhale 2 puffs by mouth every 4 to 6 hours as needed to bring     • Butalbital-APAP-Caffeine -40 MG per capsule   2   • calcium citrate-vitamin d (CALCITRATE) 315-250 MG-UNIT tablet tablet Take 1 tablet by mouth Daily. lD 6/23     • Cholecalciferol (VITAMIN D) 1000 units tablet Take 2,000 Units by mouth Daily. LD 6/23     • clopidogrel (PLAVIX) 75 MG tablet Take 75 mg by mouth Daily.     • Coenzyme Q10 (COQ10) 200 MG capsule 1 capsule Daily.     • Cyanocobalamin (B-12 COMPLIANCE INJECTION) 1000 MCG/ML kit B-12 INJECTION     • cyclobenzaprine (FLEXERIL) 5 MG tablet Take 1 tablet by mouth At Night As Needed for Muscle Spasms. 30 tablet 0   • diclofenac (VOLTAREN) 1 % gel gel DICLOFENAC SODIUM 1 % GEL     • famotidine (PEPCID) 20 MG tablet Take 20 mg by mouth 2 (Two) Times a Day. Take dos     • galantamine (RAZADYNE) 8 MG tablet Take 4 mg by mouth 2 (Two) Times a Day. Take dos     • lamoTRIgine (LaMICtal) 100 MG tablet Take 100 mg by mouth 2 (Two) Times a Day. Take dos  0   • levothyroxine (SYNTHROID, LEVOTHROID) 125 MCG tablet Take 125 mcg by mouth  "Daily. Take dos     • memantine (NAMENDA) 10 MG tablet Take 10 mg by mouth 2 (Two) Times a Day. Take dos  5   • metFORMIN (GLUCOPHAGE) 1000 MG tablet Take 1,000 mg by mouth 2 (Two) Times a Day. LD 6/26  3   • mirtazapine (REMERON) 15 MG tablet Take 15 mg by mouth every night at bedtime.     • Multiple Vitamin (DAILY VALUE MULTIVITAMIN) tablet 1 tablet Daily. LD 6/25     • omeprazole (priLOSEC) 40 MG capsule Take 40 mg by mouth Daily. Take dos     • ondansetron (ZOFRAN) 4 MG tablet TK 1 T PO Q 6 TO 8 H PRF NAUSEA OR VOM  1   • ondansetron ODT (ZOFRAN-ODT) 4 MG disintegrating tablet DISSOLVE 1 TABLET ON THE TONGUE THREE TIMES DAILY AS NEEDED     • pantoprazole (PROTONIX) 20 MG EC tablet TK 1 T PO QAM 30 MIN AC  3   • promethazine (PHENERGAN) 12.5 MG tablet Take 25 mg by mouth Every 8 (Eight) Hours As Needed.     • propranolol (INDERAL) 40 MG tablet Take 40 mg by mouth 2 (Two) Times a Day. Take dos     • REPATHA SURECLICK 140 MG/ML solution auto-injector Inject 140 mg under the skin into the appropriate area as directed Every 14 (Fourteen) Days.     • topiramate (TOPAMAX) 100 MG tablet Take 100 mg by mouth.     • traZODone (DESYREL) 100 MG tablet TK 1 T PO QHS  0   • ubrogepant 100 MG tablet 100 mg As Needed.     • venlafaxine XR (EFFEXOR-XR) 75 MG 24 hr capsule TK ONE C PO QD  0   • [DISCONTINUED] HYDROcodone-acetaminophen (NORCO) 7.5-325 MG per tablet Take 1 tablet by mouth Every 6 (Six) Hours As Needed for Moderate Pain  (Pain). 28 tablet 0     No current facility-administered medications on file prior to visit.       Objective   /77   Pulse 56   Ht 160 cm (63\")   Wt 56.2 kg (124 lb)   BMI 21.97 kg/m²     General:   Appearance: appears stated age and healthy    Orientation: AAOx3    Affect: appropriate    Gait: antalgic       VASCULAR       Right Foot Vascularity   Normal vascular exam    Dorsalis pedis:  2+  Posterior tibial:  2+  Skin Temperature: warm    Edema Grading:  None  CFT:  < 3 seconds  Pedal " Hair Growth:  Present  Varicosities: none        Left Foot Vascularity   Normal vascular exam    Dorsalis pedis:  2+  Posterior tibial:  2+  Skin Temperature: warm    Edema Grading:  None  CFT:  < 3 seconds  Pedal Hair Growth:  Present  Varicosities: none        NEUROLOGIC      Right Foot Neurologic   Light touch sensation:  Normal  Hot/Cold sensation: normal    Achilles reflex:  2+      Left Foot Neurologic   Light touch sensation:  Normal  Hot/cold sensation: normal    Achilles reflex:  2+      MUSCULOSKELETAL       Right Foot Musculoskeletal   Ecchymosis:  None  Tenderness: great toe metatarsophalangeal joint    Arch:  Pes planus  Hallux limitus: Yes        Left Foot Musculoskeletal   Ecchymosis:  None  Tenderness: Mild to the operative site  Arch:  Pes planus  Hammertoe: Rectus alignment of the second toe  Hallux valgus: corrected        MUSCLE STRENGTH      Right Foot Muscle Strength   Normal strength    Foot dorsiflexion:  5  Foot plantar flexion:  5  Foot inversion:  5  Foot eversion:  5      Left Foot Muscle Strength   Normal strength    Foot dorsiflexion:  5  Foot plantar flexion:  5  Foot inversion:  5  Foot eversion:  5      DERMATOLOGIC      Right Foot Dermatologic   Skin: skin intact and atrophic        Left Foot Dermatologic   Skin: Incision sites are well-healed.  No signs of infection.  Pin site is clean and dry.    Assessment/Plan   Diagnoses and all orders for this visit:    1. Hallux valgus (acquired), left foot (Primary)  -     XR Foot 3+ View Left    2. Hammer toe of left foot      Pin was removed from the second digit and imaging was obtained and independently reviewed showing rectus alignment of the forefoot with stable internal fixation and no progressive displacement.  I do feel that she is doing quite well at this time.  I have asked that she proceed with range of motion and manual therapy exercises involving the foot.  She may start partial protected weightbearing in the cam boot as  needed.  I do feel that she can return to her regular shoe in 3 weeks if tolerated.  I would like her to avoid high-impact and excessive activity.  She is to return in 4 weeks for reevaluation and imaging.    Orders Placed This Encounter   Procedures   • XR Foot 3+ View Left     Weight Bearing     Order Specific Question:   Reason for Exam:     Answer:   Left foot post op f/U RM 13 WB          Note is dictated utilizing voice recognition software. Unfortunately this leads to occasional typographical errors. I apologize in advance if the situation occurs. If questions occur please do not hesitate to call our office.

## 2021-08-23 ENCOUNTER — OFFICE VISIT (OUTPATIENT)
Dept: PODIATRY | Facility: CLINIC | Age: 71
End: 2021-08-23

## 2021-08-23 VITALS
DIASTOLIC BLOOD PRESSURE: 74 MMHG | HEART RATE: 53 BPM | HEIGHT: 63 IN | BODY MASS INDEX: 21.79 KG/M2 | SYSTOLIC BLOOD PRESSURE: 172 MMHG | WEIGHT: 123 LBS

## 2021-08-23 DIAGNOSIS — M20.12 HALLUX VALGUS (ACQUIRED), LEFT FOOT: ICD-10-CM

## 2021-08-23 DIAGNOSIS — M20.42 HAMMERTOE OF SECOND TOE OF LEFT FOOT: Primary | ICD-10-CM

## 2021-08-23 PROCEDURE — 99024 POSTOP FOLLOW-UP VISIT: CPT | Performed by: PODIATRIST

## 2021-08-24 NOTE — PROGRESS NOTES
08/23/2021  Foot and Ankle Surgery - Established Patient/Follow-up  Provider: Dr. Javier Cabrales, EDSON  Location: [facility]    Subjective:  Teri Hanna is a 71 y.o. female.     Chief Complaint   Patient presents with   • Left Foot - Bunions, Hammer Toe, Wound Check   • Post-op Follow-up     last pcp appt  6/2021   • Wound Check   • Suture / Staple Removal       HPI: Patient is approximately 8 weeks out from surgery.  She states that she is doing well but continues to have tightness and mild discomfort.  She has been ambulating in the cam boot as directed.  No other issues today    Allergies   Allergen Reactions   • Meloxicam GI Bleeding     Dr's do not want pt on this medication due to Hx of TIA   • Triptans Nausea And Vomiting and Shortness Of Breath   • Ephedrine Swelling     SWELLING       Current Outpatient Medications on File Prior to Visit   Medication Sig Dispense Refill   • albuterol sulfate  (90 Base) MCG/ACT inhaler Inhale 2 puffs Every 4 (Four) Hours As Needed. Inhale 2 puffs by mouth every 4 to 6 hours as needed to bring     • Butalbital-APAP-Caffeine -40 MG per capsule   2   • calcium citrate-vitamin d (CALCITRATE) 315-250 MG-UNIT tablet tablet Take 1 tablet by mouth Daily. lD 6/23     • Cholecalciferol (VITAMIN D) 1000 units tablet Take 2,000 Units by mouth Daily. LD 6/23     • clopidogrel (PLAVIX) 75 MG tablet Take 75 mg by mouth Daily.     • Coenzyme Q10 (COQ10) 200 MG capsule 1 capsule Daily.     • Cyanocobalamin (B-12 COMPLIANCE INJECTION) 1000 MCG/ML kit B-12 INJECTION     • cyclobenzaprine (FLEXERIL) 5 MG tablet Take 1 tablet by mouth At Night As Needed for Muscle Spasms. 30 tablet 0   • diclofenac (VOLTAREN) 1 % gel gel DICLOFENAC SODIUM 1 % GEL     • famotidine (PEPCID) 20 MG tablet Take 20 mg by mouth 2 (Two) Times a Day. Take dos     • galantamine (RAZADYNE) 8 MG tablet Take 4 mg by mouth 2 (Two) Times a Day. Take dos     • lamoTRIgine (LaMICtal) 100 MG tablet Take  "100 mg by mouth 2 (Two) Times a Day. Take dos  0   • levothyroxine (SYNTHROID, LEVOTHROID) 125 MCG tablet Take 125 mcg by mouth Daily. Take dos     • memantine (NAMENDA) 10 MG tablet Take 10 mg by mouth 2 (Two) Times a Day. Take dos  5   • metFORMIN (GLUCOPHAGE) 1000 MG tablet Take 1,000 mg by mouth 2 (Two) Times a Day. LD 6/26  3   • mirtazapine (REMERON) 15 MG tablet Take 15 mg by mouth every night at bedtime.     • Multiple Vitamin (DAILY VALUE MULTIVITAMIN) tablet 1 tablet Daily. LD 6/25     • omeprazole (priLOSEC) 40 MG capsule Take 40 mg by mouth Daily. Take dos     • ondansetron (ZOFRAN) 4 MG tablet TK 1 T PO Q 6 TO 8 H PRF NAUSEA OR VOM  1   • ondansetron ODT (ZOFRAN-ODT) 4 MG disintegrating tablet DISSOLVE 1 TABLET ON THE TONGUE THREE TIMES DAILY AS NEEDED     • pantoprazole (PROTONIX) 20 MG EC tablet TK 1 T PO QAM 30 MIN AC  3   • promethazine (PHENERGAN) 12.5 MG tablet Take 25 mg by mouth Every 8 (Eight) Hours As Needed.     • propranolol (INDERAL) 40 MG tablet Take 40 mg by mouth 2 (Two) Times a Day. Take dos     • REPATHA SURECLICK 140 MG/ML solution auto-injector Inject 140 mg under the skin into the appropriate area as directed Every 14 (Fourteen) Days.     • topiramate (TOPAMAX) 100 MG tablet Take 100 mg by mouth.     • traZODone (DESYREL) 100 MG tablet TK 1 T PO QHS  0   • ubrogepant 100 MG tablet 100 mg As Needed.     • venlafaxine XR (EFFEXOR-XR) 75 MG 24 hr capsule TK ONE C PO QD  0     No current facility-administered medications on file prior to visit.       Objective   /74   Pulse 53   Ht 160 cm (63\")   Wt 55.8 kg (123 lb)   BMI 21.79 kg/m²     General:   Appearance: appears stated age and healthy    Orientation: AAOx3    Affect: appropriate    Gait: antalgic       VASCULAR       Right Foot Vascularity   Normal vascular exam    Dorsalis pedis:  2+  Posterior tibial:  2+  Skin Temperature: warm    Edema Grading:  None  CFT:  < 3 seconds  Pedal Hair Growth:  Present  Varicosities: " none        Left Foot Vascularity   Normal vascular exam    Dorsalis pedis:  2+  Posterior tibial:  2+  Skin Temperature: warm    Edema Grading:  None  CFT:  < 3 seconds  Pedal Hair Growth:  Present  Varicosities: none        NEUROLOGIC      Right Foot Neurologic   Light touch sensation:  Normal  Hot/Cold sensation: normal    Achilles reflex:  2+      Left Foot Neurologic   Light touch sensation:  Normal  Hot/cold sensation: normal    Achilles reflex:  2+      MUSCULOSKELETAL       Right Foot Musculoskeletal   Ecchymosis:  None  Tenderness: great toe metatarsophalangeal joint    Arch:  Pes planus  Hallux limitus: Yes        Left Foot Musculoskeletal   Ecchymosis:  None  Tenderness: Mild to the operative site  Arch:  Pes planus  Hammertoe: Rectus alignment of the second toe  Hallux valgus: corrected        MUSCLE STRENGTH      Right Foot Muscle Strength   Normal strength    Foot dorsiflexion:  5  Foot plantar flexion:  5  Foot inversion:  5  Foot eversion:  5      Left Foot Muscle Strength   Normal strength    Foot dorsiflexion:  5  Foot plantar flexion:  5  Foot inversion:  5  Foot eversion:  5      DERMATOLOGIC      Right Foot Dermatologic   Skin: skin intact and atrophic        Left Foot Dermatologic   Skin: Incisions are well-healed.    Assessment/Plan   Diagnoses and all orders for this visit:    1. Hammertoe of second toe of left foot (Primary)  -     XR Foot 3+ View Left    2. Hallux valgus (acquired), left foot      Patient returns 8 weeks after surgery.  She does have moderate rigidity involving the second metatarsophalangeal joint region.  Imaging was reviewed showing stable internal fixation with minimal interval signs of healing involving the osteotomy site and the proximal phalangeal joint of the second digit.  I have recommended that she remain in the cam boot for the next 2 weeks and then gradually try to return to her regular shoe and normal activity as tolerated.  I would like her to monitor closely  and call with any new issues or concerns.  I would like to see her in 6 weeks for reevaluation and imaging.    Orders Placed This Encounter   Procedures   • XR Foot 3+ View Left     Scheduling Instructions:      Room 13      wb     Order Specific Question:   Reason for Exam:     Answer:   lt foot pain     Order Specific Question:   Does this patient have a diabetic monitoring/medication delivering device on?     Answer:   No     Order Specific Question:   Release to patient     Answer:   Immediate          Note is dictated utilizing voice recognition software. Unfortunately this leads to occasional typographical errors. I apologize in advance if the situation occurs. If questions occur please do not hesitate to call our office.

## 2021-10-27 ENCOUNTER — OFFICE VISIT (OUTPATIENT)
Dept: PODIATRY | Facility: CLINIC | Age: 71
End: 2021-10-27

## 2021-10-27 VITALS
SYSTOLIC BLOOD PRESSURE: 136 MMHG | DIASTOLIC BLOOD PRESSURE: 74 MMHG | WEIGHT: 123 LBS | HEIGHT: 63 IN | BODY MASS INDEX: 21.79 KG/M2 | HEART RATE: 53 BPM

## 2021-10-27 DIAGNOSIS — M79.672 LEFT FOOT PAIN: Primary | ICD-10-CM

## 2021-10-27 DIAGNOSIS — M20.42 HAMMERTOE OF SECOND TOE OF LEFT FOOT: ICD-10-CM

## 2021-10-27 DIAGNOSIS — M20.12 HALLUX VALGUS (ACQUIRED), LEFT FOOT: ICD-10-CM

## 2021-10-27 PROCEDURE — 99213 OFFICE O/P EST LOW 20 MIN: CPT | Performed by: PODIATRIST

## 2021-10-28 NOTE — PROGRESS NOTES
10/27/2021  Foot and Ankle Surgery - Established Patient/Follow-up  Provider: Dr. Javier Cabrales DPM  Location: [facility]    Subjective:  Teri Hanna is a 71 y.o. female.     Chief Complaint   Patient presents with   • Left Foot - Pain   • Follow-up     last pcp appt 4/1/2021       HPI: Patient returns for evaluation of her left foot.  She states that she is doing quite well at this time.  She does not have any prominent pain or limitation.  She is very happy with the overall appearance and function of her foot.  She continues to have mild stiffness at times.  She does continue to have various issues involving her right foot at times.    Allergies   Allergen Reactions   • Meloxicam GI Bleeding     Dr's do not want pt on this medication due to Hx of TIA   • Triptans Nausea And Vomiting and Shortness Of Breath   • Ephedrine Swelling     SWELLING       Current Outpatient Medications on File Prior to Visit   Medication Sig Dispense Refill   • albuterol sulfate  (90 Base) MCG/ACT inhaler Inhale 2 puffs Every 4 (Four) Hours As Needed. Inhale 2 puffs by mouth every 4 to 6 hours as needed to bring     • Butalbital-APAP-Caffeine -40 MG per capsule   2   • calcium citrate-vitamin d (CALCITRATE) 315-250 MG-UNIT tablet tablet Take 1 tablet by mouth Daily. lD 6/23     • Cholecalciferol (VITAMIN D) 1000 units tablet Take 2,000 Units by mouth Daily. LD 6/23     • clopidogrel (PLAVIX) 75 MG tablet Take 75 mg by mouth Daily.     • cyclobenzaprine (FLEXERIL) 5 MG tablet Take 1 tablet by mouth At Night As Needed for Muscle Spasms. 30 tablet 0   • diclofenac (VOLTAREN) 1 % gel gel DICLOFENAC SODIUM 1 % GEL     • famotidine (PEPCID) 20 MG tablet Take 20 mg by mouth 2 (Two) Times a Day. Take dos     • galantamine (RAZADYNE) 8 MG tablet Take 4 mg by mouth 2 (Two) Times a Day. Take dos     • lamoTRIgine (LaMICtal) 100 MG tablet Take 100 mg by mouth 2 (Two) Times a Day. Take 1 in am 1/2 at night  0   •  "levothyroxine (SYNTHROID, LEVOTHROID) 125 MCG tablet Take 125 mcg by mouth Daily. Take dos     • memantine (NAMENDA) 10 MG tablet Take 10 mg by mouth 2 (Two) Times a Day. Take dos  5   • metFORMIN (GLUCOPHAGE) 1000 MG tablet Take 1,000 mg by mouth 2 (Two) Times a Day. LD 6/26  3   • mirtazapine (REMERON) 15 MG tablet Take 15 mg by mouth every night at bedtime.     • Multiple Vitamin (DAILY VALUE MULTIVITAMIN) tablet 1 tablet Daily. LD 6/25     • omeprazole (priLOSEC) 40 MG capsule Take 40 mg by mouth Daily. Take dos     • ondansetron (ZOFRAN) 4 MG tablet TK 1 T PO Q 6 TO 8 H PRF NAUSEA OR VOM  1   • promethazine (PHENERGAN) 12.5 MG tablet Take 25 mg by mouth Every 8 (Eight) Hours As Needed.     • propranolol (INDERAL) 40 MG tablet Take 40 mg by mouth 2 (Two) Times a Day. Take dos     • REPATHA SURECLICK 140 MG/ML solution auto-injector Inject 140 mg under the skin into the appropriate area as directed Every 14 (Fourteen) Days.     • ubrogepant 100 MG tablet 100 mg As Needed.     • venlafaxine XR (EFFEXOR-XR) 75 MG 24 hr capsule 37.5 mg.  0   • Coenzyme Q10 (COQ10) 200 MG capsule 1 capsule Daily.     • Cyanocobalamin (B-12 COMPLIANCE INJECTION) 1000 MCG/ML kit B-12 INJECTION     • ondansetron ODT (ZOFRAN-ODT) 4 MG disintegrating tablet DISSOLVE 1 TABLET ON THE TONGUE THREE TIMES DAILY AS NEEDED     • pantoprazole (PROTONIX) 20 MG EC tablet TK 1 T PO QAM 30 MIN AC  3   • topiramate (TOPAMAX) 100 MG tablet Take 100 mg by mouth.     • traZODone (DESYREL) 100 MG tablet TK 1 T PO QHS  0     No current facility-administered medications on file prior to visit.       Objective   /74   Pulse 53   Ht 160 cm (63\")   Wt 55.8 kg (123 lb)   BMI 21.79 kg/m²     General:   Appearance: appears stated age and healthy    Orientation: AAOx3    Affect: appropriate    Gait: antalgic       VASCULAR       Right Foot Vascularity   Normal vascular exam    Dorsalis pedis:  2+  Posterior tibial:  2+  Skin Temperature: warm    Edema " Grading:  None  CFT:  < 3 seconds  Pedal Hair Growth:  Present  Varicosities: none        Left Foot Vascularity   Normal vascular exam    Dorsalis pedis:  2+  Posterior tibial:  2+  Skin Temperature: warm    Edema Grading:  None  CFT:  < 3 seconds  Pedal Hair Growth:  Present  Varicosities: none        NEUROLOGIC      Right Foot Neurologic   Light touch sensation:  Normal  Hot/Cold sensation: normal    Achilles reflex:  2+      Left Foot Neurologic   Light touch sensation:  Normal  Hot/cold sensation: normal    Achilles reflex:  2+      MUSCULOSKELETAL       Right Foot Musculoskeletal   Ecchymosis:  None  Tenderness: great toe metatarsophalangeal joint    Arch:  Pes planus  Hallux limitus: Yes        Left Foot Musculoskeletal   Ecchymosis:  None  Tenderness: Mild to the operative site  Arch:  Pes planus  Hammertoe: Rectus alignment of the second toe  Hallux valgus: corrected        MUSCLE STRENGTH      Right Foot Muscle Strength   Normal strength    Foot dorsiflexion:  5  Foot plantar flexion:  5  Foot inversion:  5  Foot eversion:  5      Left Foot Muscle Strength   Normal strength    Foot dorsiflexion:  5  Foot plantar flexion:  5  Foot inversion:  5  Foot eversion:  5      DERMATOLOGIC      Right Foot Dermatologic   Skin: skin intact and atrophic        Left Foot Dermatologic   Skin: Incisions are well-healed.    Assessment/Plan   Diagnoses and all orders for this visit:    1. Left foot pain (Primary)  -     XR Foot 3+ View Left    2. Hammertoe of second toe of left foot    3. Hallux valgus (acquired), left foot      Patient is doing quite well.  She returns 4 months after surgery to her left foot.  She is very happy with her overall improvement.  She does not have any substantial limitation or pain.  She does have mild tightness and discomfort at times.  She does have a minimal dorsal contracture of the lesser digits which is likely secondary to scar tissue formation.  We have discussed continued stretching and  manual therapy exercises.  I would like her to remain in appropriate shoes and inserts.  Patient has opted to see me in approximately 3 months to discuss her right foot.  I have asked that she call with any new issues or concerns and we will see her at that time.  Greater than 20 minutes was spent before, during, and after evaluation for patient care    Orders Placed This Encounter   Procedures   • XR Foot 3+ View Left     Scheduling Instructions:      Room 15      wb     Order Specific Question:   Reason for Exam:     Answer:   left foot pain     Order Specific Question:   Does this patient have a diabetic monitoring/medication delivering device on?     Answer:   No     Order Specific Question:   Release to patient     Answer:   Immediate          Note is dictated utilizing voice recognition software. Unfortunately this leads to occasional typographical errors. I apologize in advance if the situation occurs. If questions occur please do not hesitate to call our office.

## 2021-12-08 ENCOUNTER — APPOINTMENT (OUTPATIENT)
Dept: CT IMAGING | Facility: HOSPITAL | Age: 71
End: 2021-12-08

## 2022-01-18 ENCOUNTER — OFFICE VISIT (OUTPATIENT)
Dept: PODIATRY | Facility: CLINIC | Age: 72
End: 2022-01-18

## 2022-01-18 ENCOUNTER — TELEPHONE (OUTPATIENT)
Dept: PODIATRY | Facility: CLINIC | Age: 72
End: 2022-01-18

## 2022-01-18 VITALS
WEIGHT: 123 LBS | HEIGHT: 63 IN | DIASTOLIC BLOOD PRESSURE: 83 MMHG | SYSTOLIC BLOOD PRESSURE: 155 MMHG | BODY MASS INDEX: 21.79 KG/M2 | HEART RATE: 55 BPM

## 2022-01-18 DIAGNOSIS — M79.672 LEFT FOOT PAIN: Primary | ICD-10-CM

## 2022-01-18 DIAGNOSIS — M24.572 EQUINUS CONTRACTURE OF LEFT ANKLE: ICD-10-CM

## 2022-01-18 DIAGNOSIS — M20.42 HAMMERTOE OF SECOND TOE OF LEFT FOOT: ICD-10-CM

## 2022-01-18 PROCEDURE — 99213 OFFICE O/P EST LOW 20 MIN: CPT | Performed by: PODIATRIST

## 2022-01-18 RX ORDER — BLOOD SUGAR DIAGNOSTIC
STRIP MISCELLANEOUS SEE ADMIN INSTRUCTIONS
COMMUNITY
Start: 2021-11-24 | End: 2022-04-14

## 2022-01-18 RX ORDER — LEVOTHYROXINE SODIUM 0.15 MG/1
150 TABLET ORAL DAILY
COMMUNITY
Start: 2021-12-31

## 2022-01-18 RX ORDER — HYDROCODONE BITARTRATE AND ACETAMINOPHEN 5; 325 MG/1; MG/1
1 TABLET ORAL EVERY 6 HOURS PRN
COMMUNITY
Start: 2021-11-17

## 2022-01-18 RX ORDER — NYSTATIN 100000 [USP'U]/G
POWDER TOPICAL 2 TIMES DAILY
COMMUNITY
Start: 2021-11-23 | End: 2022-04-14

## 2022-01-18 NOTE — TELEPHONE ENCOUNTER
Caller: PATIENT     Relationship: SELF     Best call back number: [phone]    What orders are you requesting (i.e. lab or imaging): PHYSICAL THERAPY ORDER FOR LEFT FOOT     Where will you receive your lab/imaging services:   [facility]  PHONE NUMBER: [phone]      Additional notes: PATIENT CALLED TO REQUEST HER PHYSICAL THERAPY ORDER TO BE FAXED TO [facility]. PATIENT DID NOT HAVE THE FAX NUMBER BUT IS GOING TO CALL BACK WITH THAT WHEN SHE GETS IT. THANK YOU!

## 2022-01-18 NOTE — PROGRESS NOTES
01/18/2022  Foot and Ankle Surgery - Established Patient/Follow-up  Provider: Dr. Javier Cabrales DPM  Location: [facility]    Subjective:  Teri Hanna is a 71 y.o. female.     Chief Complaint   Patient presents with   • Right Foot - Pain   • Left Foot - Pain   • Follow-up     last pcp appt with kanu villa        HPI: Patient returns for follow-up regarding her left foot.  She has noticed progressive deformity involving the toes.  She does have mild discomfort with increased activity and certain shoes.  She feels that the lesser toes are gripping the floor causing cramping and discomfort at times.  She feels that the right foot is doing well.  Symptoms have gradually progressed over the last few months.  Patient continues to deal with hip and back issues.    Allergies   Allergen Reactions   • Meloxicam GI Bleeding     Dr's do not want pt on this medication due to Hx of TIA   • Triptans Nausea And Vomiting and Shortness Of Breath   • Ephedrine Swelling     SWELLING       Current Outpatient Medications on File Prior to Visit   Medication Sig Dispense Refill   • Accu-Chek Guide test strip See Admin Instructions.     • albuterol sulfate  (90 Base) MCG/ACT inhaler Inhale 2 puffs Every 4 (Four) Hours As Needed. Inhale 2 puffs by mouth every 4 to 6 hours as needed to bring     • Butalbital-APAP-Caffeine -40 MG per capsule   2   • calcium citrate-vitamin d (CALCITRATE) 315-250 MG-UNIT tablet tablet Take 1 tablet by mouth Daily. lD 6/23     • Cholecalciferol (VITAMIN D) 1000 units tablet Take 2,000 Units by mouth Daily. LD 6/23     • clopidogrel (PLAVIX) 75 MG tablet Take 75 mg by mouth Daily.     • Coenzyme Q10 (COQ10) 200 MG capsule 1 capsule Daily.     • Cyanocobalamin (B-12 COMPLIANCE INJECTION) 1000 MCG/ML kit B-12 INJECTION     • cyclobenzaprine (FLEXERIL) 5 MG tablet Take 1 tablet by mouth At Night As Needed for Muscle Spasms. 30 tablet 0   • diclofenac (VOLTAREN) 1 % gel gel  DICLOFENAC SODIUM 1 % GEL     • famotidine (PEPCID) 20 MG tablet Take 20 mg by mouth 2 (Two) Times a Day. Take dos     • galantamine (RAZADYNE) 8 MG tablet Take 4 mg by mouth 2 (Two) Times a Day. Take dos     • HYDROcodone-acetaminophen (NORCO) 5-325 MG per tablet Take 1 tablet by mouth Every 6 (Six) Hours As Needed. for pain     • lamoTRIgine (LaMICtal) 100 MG tablet Take 100 mg by mouth 2 (Two) Times a Day. Take 1 in am 1/2 at night  0   • levothyroxine (SYNTHROID, LEVOTHROID) 150 MCG tablet Take 150 mcg by mouth Daily.     • memantine (NAMENDA) 10 MG tablet Take 10 mg by mouth 2 (Two) Times a Day. Take dos  5   • metFORMIN (GLUCOPHAGE) 1000 MG tablet Take 1,000 mg by mouth 2 (Two) Times a Day. LD 6/26  3   • mirtazapine (REMERON) 15 MG tablet Take 15 mg by mouth every night at bedtime.     • Multiple Vitamin (DAILY VALUE MULTIVITAMIN) tablet 1 tablet Daily. LD 6/25     • nystatin 376180 UNIT/GM powder Apply  topically to the appropriate area as directed 2 (Two) Times a Day. to affected area     • omeprazole (priLOSEC) 40 MG capsule Take 40 mg by mouth Daily. Take dos     • ondansetron (ZOFRAN) 4 MG tablet TK 1 T PO Q 6 TO 8 H PRF NAUSEA OR VOM  1   • ondansetron ODT (ZOFRAN-ODT) 4 MG disintegrating tablet DISSOLVE 1 TABLET ON THE TONGUE THREE TIMES DAILY AS NEEDED     • pantoprazole (PROTONIX) 20 MG EC tablet TK 1 T PO QAM 30 MIN AC  3   • promethazine (PHENERGAN) 12.5 MG tablet Take 25 mg by mouth Every 8 (Eight) Hours As Needed.     • propranolol (INDERAL) 40 MG tablet Take 40 mg by mouth 2 (Two) Times a Day. Take dos     • REPATHA SURECLICK 140 MG/ML solution auto-injector Inject 140 mg under the skin into the appropriate area as directed Every 14 (Fourteen) Days.     • topiramate (TOPAMAX) 100 MG tablet Take 100 mg by mouth.     • traZODone (DESYREL) 100 MG tablet TK 1 T PO QHS  0   • ubrogepant 100 MG tablet 100 mg As Needed.     • venlafaxine XR (EFFEXOR-XR) 75 MG 24 hr capsule 37.5 mg.  0   •  "[DISCONTINUED] levothyroxine (SYNTHROID, LEVOTHROID) 125 MCG tablet Take 125 mcg by mouth Daily. Take dos       No current facility-administered medications on file prior to visit.       Objective   /83   Pulse 55   Ht 160 cm (63\")   Wt 55.8 kg (123 lb)   BMI 21.79 kg/m²     General:   Appearance: appears stated age and healthy    Orientation: AAOx3    Affect: appropriate    Gait: antalgic       VASCULAR       Right Foot Vascularity   Normal vascular exam    Dorsalis pedis:  2+  Posterior tibial:  2+  Skin Temperature: warm    Edema Grading:  None  CFT:  < 3 seconds  Pedal Hair Growth:  Present  Varicosities: none        Left Foot Vascularity   Normal vascular exam    Dorsalis pedis:  2+  Posterior tibial:  2+  Skin Temperature: warm    Edema Grading:  None  CFT:  < 3 seconds  Pedal Hair Growth:  Present  Varicosities: none        NEUROLOGIC      Right Foot Neurologic   Light touch sensation:  Normal  Hot/Cold sensation: normal    Achilles reflex:  2+      Left Foot Neurologic   Light touch sensation:  Normal  Hot/cold sensation: normal    Achilles reflex:  2+      MUSCULOSKELETAL       Right Foot Musculoskeletal   Ecchymosis:  None  Tenderness: great toe metatarsophalangeal joint    Arch:  Pes planus  Hallux limitus: Yes        Left Foot Musculoskeletal   Ecchymosis:  None  Tenderness: Mild discomfort to the second metatarsal phalangeal joint region  Arch:  Pes planus  Hammertoe: Plantar contracture of the third, fourth, and fifth toes  Hallux valgus: corrected        MUSCLE STRENGTH      Right Foot Muscle Strength   Normal strength    Foot dorsiflexion:  5  Foot plantar flexion:  5  Foot inversion:  5  Foot eversion:  5      Left Foot Muscle Strength   Normal strength    Foot dorsiflexion:  5  Foot plantar flexion:  5  Foot inversion:  5  Foot eversion:  5      DERMATOLOGIC      Right Foot Dermatologic   Skin: skin intact and atrophic        Left Foot Dermatologic   Skin: Incisions are " well-healed.     Progressive deformity involving the second digit with overlapping of the hallux.  Soft tissue contracture at the metatarsophalangeal joint.    Assessment/Plan   Diagnoses and all orders for this visit:    1. Left foot pain (Primary)    2. Hammertoe of second toe of left foot    3. Equinus contracture of left ankle      Patient returns with progressive issues involving her left foot.  She states that over the last few months she has noticed gradual deformity involving the toes.  She does have flexor contracture involving the third, fourth, and fifth digits.  She also has contracture at the second metatarsal phalangeal joint.  Patient states that she does have back and hip issues.  She has generalized stability issues walking.  I do feel that this is likely causing progressive soft tissue contracture and progressive deformity.  I did discuss this with her at length.  I have recommended that she proceed with at home manual therapy and stretching exercises.  I do feel that she would benefit from formal physical therapy.  I have asked that she acquire a pair of over-the-counter arch supports and wear on a daily basis.  We also discussed proper use of crest pad.  Patient is to monitor and call with any new issues or concerns and I will see her in 2 months for reevaluation.  Greater than 20 minutes was spent before, during, and after evaluation for patient care.    No orders of the defined types were placed in this encounter.         Note is dictated utilizing voice recognition software. Unfortunately this leads to occasional typographical errors. I apologize in advance if the situation occurs. If questions occur please do not hesitate to call our office.

## 2022-01-20 ENCOUNTER — HOSPITAL ENCOUNTER (OUTPATIENT)
Dept: INTERVENTIONAL RADIOLOGY/VASCULAR | Facility: HOSPITAL | Age: 72
Discharge: HOME | End: 2022-01-20
Admitting: ORTHOPAEDIC SURGERY

## 2022-01-20 DIAGNOSIS — M53.3 SI (SACROILIAC) JOINT DYSFUNCTION: ICD-10-CM

## 2022-01-20 PROCEDURE — 25010000002 METHYLPREDNISOLONE PER 40 MG: Performed by: ORTHOPAEDIC SURGERY

## 2022-01-20 PROCEDURE — 0 LIDOCAINE 1 % SOLUTION: Performed by: ORTHOPAEDIC SURGERY

## 2022-01-20 RX ORDER — LIDOCAINE HYDROCHLORIDE 10 MG/ML
10 INJECTION, SOLUTION INFILTRATION; PERINEURAL ONCE
Status: COMPLETED | OUTPATIENT
Start: 2022-01-20 | End: 2022-01-20

## 2022-01-20 RX ORDER — BUPIVACAINE HYDROCHLORIDE 2.5 MG/ML
3 INJECTION, SOLUTION EPIDURAL; INFILTRATION; INTRACAUDAL ONCE
Status: COMPLETED | OUTPATIENT
Start: 2022-01-20 | End: 2022-01-20

## 2022-01-20 RX ORDER — METHYLPREDNISOLONE ACETATE 40 MG/ML
80 INJECTION, SUSPENSION INTRA-ARTICULAR; INTRALESIONAL; INTRAMUSCULAR; SOFT TISSUE ONCE
Status: COMPLETED | OUTPATIENT
Start: 2022-01-20 | End: 2022-01-20

## 2022-01-20 RX ADMIN — BUPIVACAINE HYDROCHLORIDE 3 ML: 2.5 INJECTION, SOLUTION EPIDURAL; INFILTRATION; INTRACAUDAL; PERINEURAL at 14:47

## 2022-01-20 RX ADMIN — METHYLPREDNISOLONE ACETATE 80 MG: 40 INJECTION, SUSPENSION INTRA-ARTICULAR; INTRALESIONAL; INTRAMUSCULAR; INTRASYNOVIAL; SOFT TISSUE at 14:46

## 2022-01-20 RX ADMIN — LIDOCAINE HYDROCHLORIDE 5 ML: 10 INJECTION, SOLUTION INFILTRATION; PERINEURAL at 14:44

## 2022-03-31 ENCOUNTER — OFFICE VISIT (OUTPATIENT)
Dept: PODIATRY | Facility: CLINIC | Age: 72
End: 2022-03-31

## 2022-03-31 VITALS
DIASTOLIC BLOOD PRESSURE: 81 MMHG | HEIGHT: 63 IN | WEIGHT: 123 LBS | BODY MASS INDEX: 21.79 KG/M2 | SYSTOLIC BLOOD PRESSURE: 165 MMHG | HEART RATE: 57 BPM

## 2022-03-31 DIAGNOSIS — M79.674 GREAT TOE PAIN, RIGHT: Primary | ICD-10-CM

## 2022-03-31 DIAGNOSIS — M20.21 ACQUIRED HALLUX RIGIDUS, RIGHT: ICD-10-CM

## 2022-03-31 PROCEDURE — 99214 OFFICE O/P EST MOD 30 MIN: CPT | Performed by: PODIATRIST

## 2022-03-31 NOTE — PROGRESS NOTES
03/31/2022  Foot and Ankle Surgery - Established Patient/Follow-up  Provider: Dr. Javier Cabrales DPM  Location: [facility]    Subjective:  Teri Hanna is a 71 y.o. female.     Chief Complaint   Patient presents with   • Left Foot - Pain   • Left Ankle - Pain   • Right Foot - Pain, Toe Pain   • Follow-up     Last pcp appt with SHARON BYRNE 1/30/2022       HPI: Patient returns with new issue involving her right foot.  She states that the left foot is doing quite well and she denies any pain or limitation.  She has noticed progressive discomfort involving her right first metatarsophalangeal joint.  Symptoms have been worse with increased activity.  She notes discomfort with certain shoes.  She denies any known injury but states that she has had these issues intermittently for years. Recently, she has been performing multiple conservative care options without any significant improvement.    Allergies   Allergen Reactions   • Meloxicam GI Bleeding     Dr's do not want pt on this medication due to Hx of TIA   • Triptans Nausea And Vomiting and Shortness Of Breath   • Ephedrine Swelling     SWELLING       Current Outpatient Medications on File Prior to Visit   Medication Sig Dispense Refill   • Accu-Chek Guide test strip See Admin Instructions.     • albuterol sulfate  (90 Base) MCG/ACT inhaler Inhale 2 puffs Every 4 (Four) Hours As Needed. Inhale 2 puffs by mouth every 4 to 6 hours as needed to bring     • Butalbital-APAP-Caffeine -40 MG per capsule   2   • calcium citrate-vitamin d (CALCITRATE) 315-250 MG-UNIT tablet tablet Take 1 tablet by mouth Daily. lD 6/23     • Cholecalciferol (VITAMIN D) 1000 units tablet Take 2,000 Units by mouth Daily. LD 6/23     • clopidogrel (PLAVIX) 75 MG tablet Take 75 mg by mouth Daily.     • Coenzyme Q10 (COQ10) 200 MG capsule 1 capsule Daily.     • Cyanocobalamin 1000 MCG/ML kit B-12 INJECTION     • cyclobenzaprine (FLEXERIL) 5 MG tablet Take 1 tablet by  mouth At Night As Needed for Muscle Spasms. 30 tablet 0   • diclofenac (VOLTAREN) 1 % gel gel DICLOFENAC SODIUM 1 % GEL     • famotidine (PEPCID) 20 MG tablet Take 20 mg by mouth 2 (Two) Times a Day. Take dos     • galantamine (RAZADYNE) 8 MG tablet Take 4 mg by mouth 2 (Two) Times a Day. Take dos     • HYDROcodone-acetaminophen (NORCO) 5-325 MG per tablet Take 1 tablet by mouth Every 6 (Six) Hours As Needed. for pain     • lamoTRIgine (LaMICtal) 100 MG tablet Take 100 mg by mouth 2 (Two) Times a Day. Take 1 in am 1/2 at night  0   • levothyroxine (SYNTHROID, LEVOTHROID) 150 MCG tablet Take 150 mcg by mouth Daily.     • memantine (NAMENDA) 10 MG tablet Take 10 mg by mouth 2 (Two) Times a Day. Take dos  5   • metFORMIN (GLUCOPHAGE) 1000 MG tablet Take 1,000 mg by mouth 2 (Two) Times a Day. LD 6/26  3   • mirtazapine (REMERON) 15 MG tablet Take 15 mg by mouth every night at bedtime.     • Multiple Vitamin (DAILY VALUE MULTIVITAMIN) tablet 1 tablet Daily. LD 6/25     • nystatin 559395 UNIT/GM powder Apply  topically to the appropriate area as directed 2 (Two) Times a Day. to affected area     • omeprazole (priLOSEC) 40 MG capsule Take 40 mg by mouth Daily. Take dos     • ondansetron (ZOFRAN) 4 MG tablet TK 1 T PO Q 6 TO 8 H PRF NAUSEA OR VOM  1   • ondansetron ODT (ZOFRAN-ODT) 4 MG disintegrating tablet DISSOLVE 1 TABLET ON THE TONGUE THREE TIMES DAILY AS NEEDED     • pantoprazole (PROTONIX) 20 MG EC tablet TK 1 T PO QAM 30 MIN AC  3   • promethazine (PHENERGAN) 12.5 MG tablet Take 25 mg by mouth Every 8 (Eight) Hours As Needed.     • propranolol (INDERAL) 40 MG tablet Take 40 mg by mouth 2 (Two) Times a Day. Take dos     • REPATHA SURECLICK 140 MG/ML solution auto-injector Inject 140 mg under the skin into the appropriate area as directed Every 14 (Fourteen) Days.     • topiramate (TOPAMAX) 100 MG tablet Take 100 mg by mouth.     • traZODone (DESYREL) 100 MG tablet TK 1 T PO QHS  0   • ubrogepant 100 MG tablet 100 mg  "As Needed.     • venlafaxine XR (EFFEXOR-XR) 75 MG 24 hr capsule 37.5 mg.  0     No current facility-administered medications on file prior to visit.       Objective   /81   Pulse 57   Ht 160 cm (63\")   Wt 55.8 kg (123 lb)   BMI 21.79 kg/m²     General:   Appearance: appears stated age and healthy    Orientation: AAOx3    Affect: appropriate    Gait: antalgic       VASCULAR       Right Foot Vascularity   Normal vascular exam    Dorsalis pedis:  2+  Posterior tibial:  2+  Skin Temperature: warm    Edema Grading:  None  CFT:  < 3 seconds  Pedal Hair Growth:  Present  Varicosities: none        Left Foot Vascularity   Normal vascular exam    Dorsalis pedis:  2+  Posterior tibial:  2+  Skin Temperature: warm    Edema Grading:  None  CFT:  < 3 seconds  Pedal Hair Growth:  Present  Varicosities: none        NEUROLOGIC      Right Foot Neurologic   Light touch sensation:  Normal  Hot/Cold sensation: normal    Achilles reflex:  2+      Left Foot Neurologic   Light touch sensation:  Normal  Hot/cold sensation: normal    Achilles reflex:  2+      MUSCULOSKELETAL       Right Foot Musculoskeletal   Ecchymosis:  None  Tenderness: great toe metatarsophalangeal joint    Arch:  Pes planus  Hallux limitus: Yes        Left Foot Musculoskeletal   Ecchymosis:  None  Tenderness: Mild discomfort to the second metatarsal phalangeal joint region  Arch:  Pes planus  Hammertoe: Plantar contracture of the third, fourth, and fifth toes  Hallux valgus: corrected        MUSCLE STRENGTH      Right Foot Muscle Strength   Normal strength    Foot dorsiflexion:  5  Foot plantar flexion:  5  Foot inversion:  5  Foot eversion:  5      Left Foot Muscle Strength   Normal strength    Foot dorsiflexion:  5  Foot plantar flexion:  5  Foot inversion:  5  Foot eversion:  5      DERMATOLOGIC      Right Foot Dermatologic   Skin: skin intact and atrophic        Left Foot Dermatologic   Skin: Incisions are well-healed.     Discomfort with palpation " involving the right first metatarsophalangeal joint region.  Large dorsal bony prominence.    Assessment/Plan   Diagnoses and all orders for this visit:    1. Great toe pain, right (Primary)  -     XR Foot 3+ View Right    2. Acquired hallux rigidus, right  -     COVID PRE-OP / PRE-PROCEDURE SCREENING ORDER (NO ISOLATION) - Swab, Nasopharynx; Future  -     CBC (No Diff); Future  -     Basic Metabolic Panel; Future  -     ECG 12 Lead; Future  -     XR Chest 2 View; Future  -     Case Request; Standing  -     Case Request    Other orders  -     Follow Anesthesia Guidelines / Standing Orders; Future  -     Obtain Informed Consent; Future  -     Provide NPO Instructions to Patient; Future  -     Chlorhexidine Skin Prep; Future      Patient returns for discussion involving her right foot.  She denies any issues involving her left foot.  Her primary symptoms are at the right first MTPJ.  She has discomfort with weightbearing activities and certain shoes.  She has tried multiple conservative treatments without improvement.  Imaging was reviewed and discussed with patient at length.  Findings are consistent with moderate to severe degenerative changes involving the joint.  Given that she has tried and failed conservative care, she would like to discuss definitive treatment options.  Patient does not want to consider a steroid injection.  Given the level of arthritis, I do feel the most appropriate option is to proceed with first metatarsophalangeal joint arthrodesis.  We reviewed the procedure, risk, goals, and recovery.  We did discuss the extensive period of nonweightbearing and decreased overall activity after surgery.  We also reviewed risk of nonunion or delayed union.  Patient understands and would like to proceed in the near future.  All questions were answered to patient satisfaction.  Greater than 30 minutes was spent before, during, and after evaluation for patient care.    Orders Placed This Encounter    Procedures   • COVID PRE-OP / PRE-PROCEDURE SCREENING ORDER (NO ISOLATION) - Swab, Nasopharynx     Standing Status:   Future     Standing Expiration Date:   3/31/2023     Order Specific Question:   Please select your location:     Answer:   [facility]     Order Specific Question:   COVID Screening Order:     Answer:   In-House: EMERGENT/PREOP-CEPHEID, 3-4 HR TAT [[id number]]     Order Specific Question:   Previously tested for COVID-19?     Answer:   Yes     Order Specific Question:   Employed in healthcare setting?     Answer:   No     Order Specific Question:   Symptomatic for COVID-19 as defined by CDC?     Answer:   No     Order Specific Question:   Hospitalized for COVID-19?     Answer:   No     Order Specific Question:   Admitted to ICU for COVID-19?     Answer:   No     Order Specific Question:   Resident in a congregate (group) care setting?     Answer:   No     Order Specific Question:   Pregnant?     Answer:   No     Order Specific Question:   Release to patient     Answer:   Immediate   • XR Foot 3+ View Right     Order Specific Question:   Reason for Exam:     Answer:   right great toe pain Room 14 wb     Order Specific Question:   Does this patient have a diabetic monitoring/medication delivering device on?     Answer:   No     Order Specific Question:   Release to patient     Answer:   Immediate   • XR Chest 2 View     Standing Status:   Future     Standing Expiration Date:   3/31/2023     Order Specific Question:   Reason for Exam:     Answer:   Preop   • CBC (No Diff)     Standing Status:   Future     Standing Expiration Date:   3/31/2023     Order Specific Question:   Release to patient     Answer:   Immediate   • Basic Metabolic Panel     Standing Status:   Future     Standing Expiration Date:   3/31/2023     Order Specific Question:   Release to patient     Answer:   Immediate   • Follow Anesthesia Guidelines / Standing Orders     Standing Status:   Future   • Obtain Informed Consent     Standing  Status:   Future     Order Specific Question:   Informed Consent Given For     Answer:   Right first metatarsophalangeal joint arthrodesis with internal fixation   • Provide NPO Instructions to Patient     Standing Status:   Future   • Chlorhexidine Skin Prep     Chlorhexidine Skin Prep and Instructions For All Patients Having A Procedure Requiring an Outward Incision if Not Allergic. If Allergic, Give Antibacterial Skin Wipes and Instructions. Do Not Use For Facial Cases or on Any Mucus Membranes.     Standing Status:   Future   • ECG 12 Lead     Standing Status:   Future     Standing Expiration Date:   3/31/2023     Order Specific Question:   Reason for Exam:     Answer:   Preop          Note is dictated utilizing voice recognition software. Unfortunately this leads to occasional typographical errors. I apologize in advance if the situation occurs. If questions occur please do not hesitate to call our office.

## 2022-04-07 PROBLEM — M20.21 ACQUIRED HALLUX RIGIDUS, RIGHT: Status: ACTIVE | Noted: 2022-04-07

## 2022-04-11 ENCOUNTER — APPOINTMENT (OUTPATIENT)
Dept: LAB | Facility: HOSPITAL | Age: 72
End: 2022-04-11

## 2022-04-12 ENCOUNTER — HOSPITAL ENCOUNTER (OUTPATIENT)
Dept: GENERAL RADIOLOGY | Facility: HOSPITAL | Age: 72
Discharge: HOME | End: 2022-04-12

## 2022-04-12 ENCOUNTER — LAB (OUTPATIENT)
Dept: LAB | Facility: HOSPITAL | Age: 72
End: 2022-04-12

## 2022-04-12 ENCOUNTER — HOSPITAL ENCOUNTER (OUTPATIENT)
Dept: CARDIOLOGY | Facility: HOSPITAL | Age: 72
Discharge: HOME | End: 2022-04-12

## 2022-04-12 DIAGNOSIS — M20.21 ACQUIRED HALLUX RIGIDUS, RIGHT: ICD-10-CM

## 2022-04-12 LAB
ANION GAP SERPL CALCULATED.3IONS-SCNC: 11.6 MMOL/L (ref 5–15)
BUN SERPL-MCNC: 23 MG/DL (ref 8–23)
BUN/CREAT SERPL: 27.1 (ref 7–25)
CALCIUM SPEC-SCNC: 9.9 MG/DL (ref 8.6–10.5)
CHLORIDE SERPL-SCNC: 103 MMOL/L (ref 98–107)
CO2 SERPL-SCNC: 27.4 MMOL/L (ref 22–29)
CREAT SERPL-MCNC: 0.85 MG/DL (ref 0.57–1)
DEPRECATED RDW RBC AUTO: 42.7 FL (ref 37–54)
EGFRCR SERPLBLD CKD-EPI 2021: 73.8 ML/MIN/1.73
ERYTHROCYTE [DISTWIDTH] IN BLOOD BY AUTOMATED COUNT: 13.1 % (ref 12.3–15.4)
GLUCOSE SERPL-MCNC: 118 MG/DL (ref 65–99)
HCT VFR BLD AUTO: 41.9 % (ref 34–46.6)
HGB BLD-MCNC: 14.4 G/DL (ref 12–15.9)
MCH RBC QN AUTO: 30.7 PG (ref 26.6–33)
MCHC RBC AUTO-ENTMCNC: 34.4 G/DL (ref 31.5–35.7)
MCV RBC AUTO: 89.3 FL (ref 79–97)
PLATELET # BLD AUTO: 323 10*3/MM3 (ref 140–450)
PMV BLD AUTO: 9 FL (ref 6–12)
POTASSIUM SERPL-SCNC: 5.1 MMOL/L (ref 3.5–5.2)
RBC # BLD AUTO: 4.69 10*6/MM3 (ref 3.77–5.28)
SODIUM SERPL-SCNC: 142 MMOL/L (ref 136–145)
WBC NRBC COR # BLD: 5.19 10*3/MM3 (ref 3.4–10.8)

## 2022-04-12 PROCEDURE — 93010 ELECTROCARDIOGRAM REPORT: CPT | Performed by: INTERNAL MEDICINE

## 2022-04-12 PROCEDURE — 85027 COMPLETE CBC AUTOMATED: CPT

## 2022-04-12 PROCEDURE — 93005 ELECTROCARDIOGRAM TRACING: CPT | Performed by: PODIATRIST

## 2022-04-12 PROCEDURE — 36415 COLL VENOUS BLD VENIPUNCTURE: CPT

## 2022-04-12 PROCEDURE — 80048 BASIC METABOLIC PNL TOTAL CA: CPT

## 2022-04-12 PROCEDURE — 71046 X-RAY EXAM CHEST 2 VIEWS: CPT

## 2022-04-12 NOTE — PAT
Abnormal EKG sent to anesthesia for clearance,  Need Cardiac clearance from Cardiologist per Anesthesia,  PEARL Kelly and NAZ Avila notified per s.c

## 2022-04-13 PROBLEM — I95.1 ORTHOSTASIS: Status: ACTIVE | Noted: 2017-05-01

## 2022-04-13 PROBLEM — G43.009 MIGRAINE WITHOUT AURA AND WITHOUT STATUS MIGRAINOSUS, NOT INTRACTABLE: Status: ACTIVE | Noted: 2018-11-12

## 2022-04-13 PROBLEM — I95.1 ORTHOSTATIC HYPOTENSION: Status: ACTIVE | Noted: 2020-01-01

## 2022-04-13 PROBLEM — F98.8 ATTENTION DEFICIT DISORDER (ADD) WITHOUT HYPERACTIVITY: Status: ACTIVE | Noted: 2022-04-13

## 2022-04-13 PROBLEM — R41.841 COGNITIVE COMMUNICATION DEFICIT: Status: ACTIVE | Noted: 2021-01-08

## 2022-04-13 PROBLEM — M47.817 OSTEOARTHRITIS OF LUMBOSACRAL SPINE WITHOUT MYELOPATHY: Status: ACTIVE | Noted: 2019-01-10

## 2022-04-13 PROBLEM — Z99.89 OSA ON CPAP: Status: ACTIVE | Noted: 2022-03-27

## 2022-04-13 PROBLEM — M79.7 FIBROMYALGIA: Status: ACTIVE | Noted: 2020-01-01

## 2022-04-13 PROBLEM — Z79.899 OTHER LONG TERM (CURRENT) DRUG THERAPY: Status: ACTIVE | Noted: 2019-01-10

## 2022-04-13 PROBLEM — F32.9 MAJOR DEPRESSIVE DISORDER, SINGLE EPISODE, UNSPECIFIED: Status: ACTIVE | Noted: 2020-01-01

## 2022-04-13 PROBLEM — G47.33 OSA ON CPAP: Status: ACTIVE | Noted: 2022-03-27

## 2022-04-13 PROBLEM — E11.9 TYPE 2 DIABETES MELLITUS (HCC): Status: ACTIVE | Noted: 2017-03-19

## 2022-04-13 PROBLEM — H55.00 NYSTAGMUS: Status: ACTIVE | Noted: 2018-06-19

## 2022-04-13 PROBLEM — G93.41 ACUTE METABOLIC ENCEPHALOPATHY: Status: ACTIVE | Noted: 2018-10-21

## 2022-04-13 PROBLEM — M96.1 POSTLAMINECTOMY SYNDROME, LUMBAR REGION: Status: ACTIVE | Noted: 2019-01-10

## 2022-04-13 PROBLEM — M25.511 SHOULDER PAIN, RIGHT: Status: ACTIVE | Noted: 2019-01-10

## 2022-04-13 PROBLEM — F01.50 DEMENTIA, VASCULAR, MIXED, WITHOUT BEHAVIORAL DISTURBANCE (HCC): Status: ACTIVE | Noted: 2021-01-25

## 2022-04-13 PROBLEM — E53.8 B12 DEFICIENCY: Status: ACTIVE | Noted: 2017-03-29

## 2022-04-13 PROBLEM — R41.9 COGNITIVE SAFETY ISSUE: Status: ACTIVE | Noted: 2022-03-27

## 2022-04-13 PROBLEM — G89.29 CHRONIC PAIN: Status: ACTIVE | Noted: 2019-01-10

## 2022-04-13 PROBLEM — M25.50 POLYARTHRALGIA: Status: ACTIVE | Noted: 2019-01-10

## 2022-04-13 PROBLEM — G40.909 EPILEPSY, UNSPECIFIED, NOT INTRACTABLE, WITHOUT STATUS EPILEPTICUS (HCC): Status: ACTIVE | Noted: 2020-01-01

## 2022-04-13 PROBLEM — R29.90 STROKE-LIKE SYMPTOMS: Status: ACTIVE | Noted: 2017-03-19

## 2022-04-13 PROBLEM — R13.10 DYSPHAGIA: Status: ACTIVE | Noted: 2018-10-22

## 2022-04-13 PROBLEM — G30.9 ALZHEIMER'S DISEASE, UNSPECIFIED (CODE) (HCC): Status: ACTIVE | Noted: 2021-01-18

## 2022-04-13 PROBLEM — Z86.73 PRSNL HX OF TIA (TIA), AND CEREB INFRC W/O RESID DEFICITS: Status: ACTIVE | Noted: 2020-01-01

## 2022-04-13 PROBLEM — R40.4 ALTERED AWARENESS, TRANSIENT: Status: ACTIVE | Noted: 2018-06-19

## 2022-04-13 PROBLEM — T74.91XA: Status: ACTIVE | Noted: 2022-03-27

## 2022-04-13 PROBLEM — F43.0 ACUTE STRESS DISORDER: Status: ACTIVE | Noted: 2022-03-27

## 2022-04-13 PROBLEM — H81.10 BENIGN PAROXYSMAL POSITIONAL VERTIGO: Status: ACTIVE | Noted: 2018-01-02

## 2022-04-13 PROBLEM — E03.9 HYPOTHYROIDISM: Status: ACTIVE | Noted: 2022-04-13

## 2022-04-13 PROBLEM — R68.89 SPELLS OF DECREASED ATTENTIVENESS: Status: ACTIVE | Noted: 2019-08-12

## 2022-04-13 PROBLEM — M54.50 CHRONIC LOW BACK PAIN: Status: ACTIVE | Noted: 2020-01-01

## 2022-04-13 PROBLEM — G89.29 CHRONIC LOW BACK PAIN: Status: ACTIVE | Noted: 2020-01-01

## 2022-04-13 PROBLEM — E78.5 HYPERLIPIDEMIA: Status: ACTIVE | Noted: 2018-01-02

## 2022-04-13 PROBLEM — R20.2 PARESTHESIA OF LEFT UPPER AND LOWER EXTREMITY: Status: ACTIVE | Noted: 2017-12-19

## 2022-04-13 LAB — QT INTERVAL: 438 MS

## 2022-04-14 ENCOUNTER — OFFICE VISIT (OUTPATIENT)
Dept: CARDIOLOGY | Facility: CLINIC | Age: 72
End: 2022-04-14

## 2022-04-14 VITALS
HEIGHT: 63 IN | WEIGHT: 139 LBS | OXYGEN SATURATION: 96 % | HEART RATE: 68 BPM | BODY MASS INDEX: 24.63 KG/M2 | SYSTOLIC BLOOD PRESSURE: 107 MMHG | DIASTOLIC BLOOD PRESSURE: 63 MMHG

## 2022-04-14 DIAGNOSIS — R06.09 DYSPNEA ON EXERTION: ICD-10-CM

## 2022-04-14 DIAGNOSIS — M20.12 HALLUX VALGUS (ACQUIRED), LEFT FOOT: ICD-10-CM

## 2022-04-14 DIAGNOSIS — R94.31 ABNORMAL EKG: ICD-10-CM

## 2022-04-14 DIAGNOSIS — E78.5 HYPERLIPIDEMIA, UNSPECIFIED HYPERLIPIDEMIA TYPE: ICD-10-CM

## 2022-04-14 DIAGNOSIS — Z01.810 PREOP CARDIOVASCULAR EXAM: Primary | ICD-10-CM

## 2022-04-14 PROCEDURE — 93000 ELECTROCARDIOGRAM COMPLETE: CPT | Performed by: NURSE PRACTITIONER

## 2022-04-14 PROCEDURE — 99204 OFFICE O/P NEW MOD 45 MIN: CPT | Performed by: NURSE PRACTITIONER

## 2022-04-14 RX ORDER — VENLAFAXINE HYDROCHLORIDE 37.5 MG/1
37.5 CAPSULE, EXTENDED RELEASE ORAL NIGHTLY
COMMUNITY
Start: 2022-04-08

## 2022-04-14 NOTE — PROGRESS NOTES
Cardiology Office New Patient Visit      Primary Care Provider:  Arcelia Rasheed APRN    Reason for Visit:     Preoperative cardiovascular risk assessment requested  Left hammertoe  Abnormal EKG  Dyspnea on exertion  Dyslipidemia        Subjective     CC: Left foot pain, shortness of breath with exertion    History of Present Illness       Teri Hanna is a 71 y.o. female.  Patient is a very pleasant 71-year-old female with no prior known history of CAD, MI or heart failure.    She presents to our office today requesting preoperative cardiovascular risk assessment prior to upcoming surgery for left hammertoe scheduled on Monday, April 18.    Patient's preoperative EKG was abnormal showing anterior septal T wave inversion.    Patient denies any prior cardiac testing.    She denies chest pain but reports she does have some episodes of dyspnea with exertion such as doing her chores or walking fast.    The patient is known to have diabetes, dyslipidemia, hypothyroidism, family history of coronary artery disease.            ASSESSMENT/PLAN:        Diagnoses and all orders for this visit:    1. Preop cardiovascular exam (Primary)  Comments:  Preoperative cardiovascular risk assessment requested prior to surgery for left great toe for hammertoe  Orders:  -     Stress Test With Myocardial Perfusion (1 Day); Future    2. Hallux valgus (acquired), left foot    3. Abnormal EKG  Comments:  Abnormal EKG with anteroseptal T wave inversion  Orders:  -     Stress Test With Myocardial Perfusion (1 Day); Future    4. Dyspnea on exertion    5. Hyperlipidemia, unspecified hyperlipidemia type        MEDICAL DECISION MAKING:    Preoperative cardiovascular risk assessment has been requested in this patient with multiple cardiovascular risk factors.  She has an abnormal baseline EKG.  She has not ever had a previous noninvasive ischemic evaluation.    I would like to proceed with Lexiscan Myoview to rule out an ischemic burden.  We  will get this added on for tomorrow as the patient surgery is scheduled for Monday.    We continue to recommend aggressive risk factor modification.    Surgical clearance will be made pending review of her Lexiscan stress test    Past Medical History:   Diagnosis Date   • Arthritis    • Dementia (HCC)    • Depression    • Diabetes (HCC)    • Dizziness    • Hyperlipemia    • Hypertension    • Knee pain, bilateral    • Low back pain    • Migraine    • Rotator cuff disorder     BILATERAL   • Shoulder pain, bilateral    • Sleep apnea    • Thyroid disease    • TIA (transient ischemic attack)        Past Surgical History:   Procedure Laterality Date   • BACK SURGERY     • BUNIONECTOMY Left 6/28/2021    Procedure: BUNIONECTOMY KARRIE MINIMALLY INVASIVE WITH INTERNAL FIXATION AND HALLUX NAIL AVULSION WITH CHEMICAL MATRIXECTOMY;  Surgeon: CAMERON Cabrales DPM;  Location: [facility] MAIN OR;  Service: Podiatry;  Laterality: Left;   • CARPAL TUNNEL RELEASE      RT WRIST   • ENDOSCOPY     • HAMMER TOE REPAIR Left 6/28/2021    Procedure: HAMMER TOE REPAIR SECOND TOE;  Surgeon: CAMERON Cabrales DPM;  Location: [facility] MAIN OR;  Service: Podiatry;  Laterality: Left;   • RETINAL DETACHMENT SURGERY     • TUBAL ABDOMINAL LIGATION           Current Outpatient Medications:   •  albuterol sulfate  (90 Base) MCG/ACT inhaler, Inhale 2 puffs Every 4 (Four) Hours As Needed. Inhale 2 puffs by mouth every 4 to 6 hours as needed to bring, Disp: , Rfl:   •  Butalbital-APAP-Caffeine -40 MG per capsule, , Disp: , Rfl: 2  •  Cholecalciferol (VITAMIN D) 1000 units tablet, Take 2,000 Units by mouth Daily. LD 6/23, Disp: , Rfl:   •  clopidogrel (PLAVIX) 75 MG tablet, Take 75 mg by mouth Daily. LD 4/13 from Dr. Cabrales, Disp: , Rfl:   •  Coenzyme Q10 (COQ10) 200 MG capsule, 1 capsule Daily., Disp: , Rfl:   •  diclofenac (VOLTAREN) 1 % gel gel, DICLOFENAC SODIUM 1 % GEL, Disp: , Rfl:   •  famotidine (PEPCID) 20 MG tablet, Take 20 mg by mouth 2  (Two) Times a Day. Take dos, Disp: , Rfl:   •  galantamine (RAZADYNE) 8 MG tablet, Take 4 mg by mouth 2 (Two) Times a Day. Take dos, Disp: , Rfl:   •  HYDROcodone-acetaminophen (NORCO) 5-325 MG per tablet, Take 1 tablet by mouth Every 6 (Six) Hours As Needed. for pain, Disp: , Rfl:   •  lamoTRIgine (LaMICtal) 100 MG tablet, Take 100 mg by mouth 2 (Two) Times a Day. Take 1 in am 1/2 at night, Disp: , Rfl: 0  •  levothyroxine (SYNTHROID, LEVOTHROID) 150 MCG tablet, Take 150 mcg by mouth Daily., Disp: , Rfl:   •  memantine (NAMENDA) 10 MG tablet, Take 10 mg by mouth 2 (Two) Times a Day. Take dos, Disp: , Rfl: 5  •  metFORMIN (GLUCOPHAGE) 1000 MG tablet, Take 1,000 mg by mouth 2 (Two) Times a Day. HOLD 48 hours before surgery, Disp: , Rfl: 3  •  mirtazapine (REMERON) 15 MG tablet, Take 15 mg by mouth every night at bedtime., Disp: , Rfl:   •  ondansetron ODT (ZOFRAN-ODT) 4 MG disintegrating tablet, DISSOLVE 1 TABLET ON THE TONGUE THREE TIMES DAILY AS NEEDED, Disp: , Rfl:   •  promethazine (PHENERGAN) 12.5 MG tablet, Take 25 mg by mouth Every 8 (Eight) Hours As Needed., Disp: , Rfl:   •  propranolol (INDERAL) 40 MG tablet, Take 40 mg by mouth 2 (Two) Times a Day. Take dos, Disp: , Rfl:   •  REPATHA SURECLICK 140 MG/ML solution auto-injector, Inject 140 mg under the skin into the appropriate area as directed Every 14 (Fourteen) Days., Disp: , Rfl:   •  ubrogepant 100 MG tablet, 100 mg As Needed., Disp: , Rfl:   •  venlafaxine XR (EFFEXOR-XR) 37.5 MG 24 hr capsule, Take 37.5 mg by mouth Every Night., Disp: , Rfl:   •  venlafaxine XR (EFFEXOR-XR) 75 MG 24 hr capsule, 37.5 mg., Disp: , Rfl: 0    Social History     Socioeconomic History   • Marital status:    Tobacco Use   • Smoking status: Never Smoker   • Smokeless tobacco: Never Used   Vaping Use   • Vaping Use: Never used   Substance and Sexual Activity   • Alcohol use: Yes     Comment: OCC  BEER   • Drug use: Defer   • Sexual activity: Defer       Family History  "  Problem Relation Age of Onset   • Seizures Mother    • Cancer Father    • Heart disease Father        The following portions of the patient's history were reviewed and updated as appropriate: allergies, current medications, past family history, past medical history, past social history, past surgical history and problem list.    Review of Systems   Constitutional: Negative for decreased appetite and diaphoresis.   HENT: Negative for congestion, hearing loss and nosebleeds.    Cardiovascular: Positive for dyspnea on exertion. Negative for chest pain, claudication, irregular heartbeat, leg swelling, near-syncope, orthopnea, palpitations, paroxysmal nocturnal dyspnea and syncope.   Respiratory: Negative for cough, shortness of breath and sleep disturbances due to breathing.    Endocrine: Negative for polyuria.   Hematologic/Lymphatic: Does not bruise/bleed easily.   Skin: Negative for itching and rash.   Musculoskeletal: Positive for arthritis and joint pain. Negative for back pain, muscle weakness and myalgias.   Gastrointestinal: Negative for abdominal pain, change in bowel habit and nausea.   Genitourinary: Negative for dysuria, flank pain, frequency and hesitancy.   Neurological: Negative for dizziness, tremors and weakness.   Psychiatric/Behavioral: Negative for altered mental status. The patient does not have insomnia.        /63   Pulse 68   Ht 160 cm (62.99\")   Wt 63 kg (139 lb)   SpO2 96%   BMI 24.63 kg/m² .    Body mass index is 24.63 kg/m².    Objective     Vitals reviewed.   Constitutional:       General: Not in acute distress.     Appearance: Normal appearance. Well-developed.   Eyes:      Pupils: Pupils are equal, round, and reactive to light.   HENT:      Head: Normocephalic and atraumatic.   Neck:      Vascular: No JVD.   Pulmonary:      Effort: Pulmonary effort is normal.      Breath sounds: Normal breath sounds.   Cardiovascular:      Normal rate. Regular rhythm.   Pulses:     Intact " distal pulses.   Edema:     Peripheral edema absent.   Abdominal:      General: There is no distension.      Palpations: Abdomen is soft.      Tenderness: There is no abdominal tenderness.   Musculoskeletal: Normal range of motion.      Cervical back: Normal range of motion and neck supple. Skin:     General: Skin is warm and dry.   Neurological:      Mental Status: Alert and oriented to person, place, and time.             ECG 12 Lead    Date/Time: 4/14/2022 4:30 PM  Performed by: Sarah Quiñones APRN  Authorized by: Sarah Quiñones APRN   Comparison: not compared with previous ECG   Rhythm: sinus rhythm  Rate: normal  BPM: 68  Conduction: left anterior fascicular block  T inversion: V1 and V2  QRS axis: normal  Other findings: left ventricular hypertrophy    Clinical impression: abnormal EKG

## 2022-04-15 ENCOUNTER — HOSPITAL ENCOUNTER (OUTPATIENT)
Dept: NUCLEAR MEDICINE | Facility: HOSPITAL | Age: 72
Discharge: HOME | End: 2022-04-15

## 2022-04-15 ENCOUNTER — LAB (OUTPATIENT)
Dept: LAB | Facility: HOSPITAL | Age: 72
End: 2022-04-15

## 2022-04-15 DIAGNOSIS — R94.31 ABNORMAL EKG: ICD-10-CM

## 2022-04-15 DIAGNOSIS — Z01.818 PRE-OP TESTING: Primary | ICD-10-CM

## 2022-04-15 DIAGNOSIS — Z01.810 PREOP CARDIOVASCULAR EXAM: ICD-10-CM

## 2022-04-15 LAB
BH CV REST NUCLEAR ISOTOPE DOSE: 7.9 MCI
BH CV STRESS BP STAGE 1: NORMAL
BH CV STRESS BP STAGE 2: NORMAL
BH CV STRESS BP STAGE 3: NORMAL
BH CV STRESS BP STAGE 4: NORMAL
BH CV STRESS COMMENTS STAGE 1: NORMAL
BH CV STRESS COMMENTS STAGE 2: NORMAL
BH CV STRESS DOSE REGADENOSON STAGE 1: 0.4
BH CV STRESS DURATION MIN STAGE 1: 0
BH CV STRESS DURATION MIN STAGE 2: 4
BH CV STRESS DURATION SEC STAGE 1: 10
BH CV STRESS DURATION SEC STAGE 2: 0
BH CV STRESS HR STAGE 1: 68
BH CV STRESS HR STAGE 2: 75
BH CV STRESS HR STAGE 3: 74
BH CV STRESS HR STAGE 4: 75
BH CV STRESS NUCLEAR ISOTOPE DOSE: 21.9 MCI
BH CV STRESS PROTOCOL 1: NORMAL
BH CV STRESS RECOVERY BP: NORMAL MMHG
BH CV STRESS RECOVERY HR: 71 BPM
BH CV STRESS STAGE 1: 1
BH CV STRESS STAGE 2: 2
BH CV STRESS STAGE 3: 3
BH CV STRESS STAGE 4: 4
LV EF NUC BP: 74 %
MAXIMAL PREDICTED HEART RATE: 150 BPM
PERCENT MAX PREDICTED HR: 50 %
SARS-COV-2 ORF1AB RESP QL NAA+PROBE: NOT DETECTED
STRESS BASELINE BP: NORMAL MMHG
STRESS BASELINE HR: 55 BPM
STRESS PERCENT HR: 59 %
STRESS POST PEAK BP: NORMAL MMHG
STRESS POST PEAK HR: 75 BPM
STRESS TARGET HR: 128 BPM

## 2022-04-15 PROCEDURE — 0 TECHNETIUM TETROFOSMIN KIT: Performed by: NURSE PRACTITIONER

## 2022-04-15 PROCEDURE — U0004 COV-19 TEST NON-CDC HGH THRU: HCPCS

## 2022-04-15 PROCEDURE — A9502 TC99M TETROFOSMIN: HCPCS | Performed by: NURSE PRACTITIONER

## 2022-04-15 PROCEDURE — 78452 HT MUSCLE IMAGE SPECT MULT: CPT

## 2022-04-15 PROCEDURE — 25010000002 REGADENOSON 0.4 MG/5ML SOLUTION: Performed by: NURSE PRACTITIONER

## 2022-04-15 PROCEDURE — 93017 CV STRESS TEST TRACING ONLY: CPT

## 2022-04-15 PROCEDURE — 78452 HT MUSCLE IMAGE SPECT MULT: CPT | Performed by: INTERNAL MEDICINE

## 2022-04-15 PROCEDURE — 93018 CV STRESS TEST I&R ONLY: CPT | Performed by: INTERNAL MEDICINE

## 2022-04-15 RX ADMIN — REGADENOSON 0.4 MG: 0.08 INJECTION, SOLUTION INTRAVENOUS at 11:33

## 2022-04-15 RX ADMIN — TETROFOSMIN 1 DOSE: 1.38 INJECTION, POWDER, LYOPHILIZED, FOR SOLUTION INTRAVENOUS at 10:23

## 2022-04-15 RX ADMIN — TETROFOSMIN 1 DOSE: 1.38 INJECTION, POWDER, LYOPHILIZED, FOR SOLUTION INTRAVENOUS at 11:33

## 2022-04-15 NOTE — PAT
Spoke with charge nurse Shiloh at [facility]  about pt having a lexiscan on 4/16/2022 for cardiac clearance for surgery on 4/18/2022.  Ordered by Sarah Quiñones.

## 2022-04-22 ENCOUNTER — LAB (OUTPATIENT)
Dept: LAB | Facility: HOSPITAL | Age: 72
End: 2022-04-22

## 2022-04-22 LAB — SARS-COV-2 ORF1AB RESP QL NAA+PROBE: NOT DETECTED

## 2022-04-22 PROCEDURE — U0004 COV-19 TEST NON-CDC HGH THRU: HCPCS

## 2022-04-22 PROCEDURE — C9803 HOPD COVID-19 SPEC COLLECT: HCPCS

## 2022-04-25 ENCOUNTER — HOSPITAL ENCOUNTER (OUTPATIENT)
Facility: HOSPITAL | Age: 72
Setting detail: HOSPITAL OUTPATIENT SURGERY
Discharge: HOME | End: 2022-04-25
Attending: PODIATRIST | Admitting: PODIATRIST

## 2022-04-25 ENCOUNTER — ANESTHESIA (OUTPATIENT)
Dept: PERIOP | Facility: HOSPITAL | Age: 72
End: 2022-04-25

## 2022-04-25 ENCOUNTER — APPOINTMENT (OUTPATIENT)
Dept: GENERAL RADIOLOGY | Facility: HOSPITAL | Age: 72
End: 2022-04-25

## 2022-04-25 ENCOUNTER — ANESTHESIA EVENT (OUTPATIENT)
Dept: PERIOP | Facility: HOSPITAL | Age: 72
End: 2022-04-25

## 2022-04-25 VITALS
HEIGHT: 63 IN | WEIGHT: 136 LBS | DIASTOLIC BLOOD PRESSURE: 84 MMHG | HEART RATE: 75 BPM | OXYGEN SATURATION: 96 % | SYSTOLIC BLOOD PRESSURE: 167 MMHG | BODY MASS INDEX: 24.1 KG/M2 | TEMPERATURE: 97.2 F | RESPIRATION RATE: 14 BRPM

## 2022-04-25 DIAGNOSIS — M20.21 ACQUIRED HALLUX RIGIDUS, RIGHT: ICD-10-CM

## 2022-04-25 LAB
GLUCOSE BLDC GLUCOMTR-MCNC: 113 MG/DL (ref 70–105)
GLUCOSE BLDC GLUCOMTR-MCNC: 117 MG/DL (ref 70–105)

## 2022-04-25 PROCEDURE — C1713 ANCHOR/SCREW BN/BN,TIS/BN: HCPCS | Performed by: PODIATRIST

## 2022-04-25 PROCEDURE — 76000 FLUOROSCOPY <1 HR PHYS/QHP: CPT

## 2022-04-25 PROCEDURE — 28750 FUSION OF BIG TOE JOINT: CPT | Performed by: PODIATRIST

## 2022-04-25 PROCEDURE — 25010000002 DEXAMETHASONE PER 1 MG

## 2022-04-25 PROCEDURE — 82962 GLUCOSE BLOOD TEST: CPT

## 2022-04-25 PROCEDURE — 73620 X-RAY EXAM OF FOOT: CPT

## 2022-04-25 PROCEDURE — 25010000002 CEFAZOLIN PER 500 MG: Performed by: PODIATRIST

## 2022-04-25 PROCEDURE — 25010000002 ONDANSETRON PER 1 MG

## 2022-04-25 PROCEDURE — 25010000002 PROPOFOL 200 MG/20ML EMULSION

## 2022-04-25 PROCEDURE — 25010000002 FENTANYL CITRATE (PF) 100 MCG/2ML SOLUTION

## 2022-04-25 DEVICE — IMPLANTABLE DEVICE
Type: IMPLANTABLE DEVICE | Site: FOOT | Status: FUNCTIONAL
Brand: ORTHOLOC 3DI

## 2022-04-25 DEVICE — IMPLANTABLE DEVICE
Type: IMPLANTABLE DEVICE | Site: FOOT | Status: FUNCTIONAL
Brand: ORTHOLOC

## 2022-04-25 DEVICE — IMPLANTABLE DEVICE
Type: IMPLANTABLE DEVICE | Site: FOOT | Status: FUNCTIONAL
Brand: DART-FIRE

## 2022-04-25 RX ORDER — ACETAMINOPHEN 650 MG/1
650 SUPPOSITORY RECTAL ONCE AS NEEDED
Status: DISCONTINUED | OUTPATIENT
Start: 2022-04-25 | End: 2022-04-25 | Stop reason: HOSPADM

## 2022-04-25 RX ORDER — IPRATROPIUM BROMIDE AND ALBUTEROL SULFATE 2.5; .5 MG/3ML; MG/3ML
3 SOLUTION RESPIRATORY (INHALATION) ONCE AS NEEDED
Status: DISCONTINUED | OUTPATIENT
Start: 2022-04-25 | End: 2022-04-25 | Stop reason: HOSPADM

## 2022-04-25 RX ORDER — SODIUM CHLORIDE, SODIUM LACTATE, POTASSIUM CHLORIDE, CALCIUM CHLORIDE 600; 310; 30; 20 MG/100ML; MG/100ML; MG/100ML; MG/100ML
1000 INJECTION, SOLUTION INTRAVENOUS CONTINUOUS
Status: DISCONTINUED | OUTPATIENT
Start: 2022-04-25 | End: 2022-04-25 | Stop reason: HOSPADM

## 2022-04-25 RX ORDER — PROMETHAZINE HYDROCHLORIDE 25 MG/1
25 TABLET ORAL ONCE AS NEEDED
Status: DISCONTINUED | OUTPATIENT
Start: 2022-04-25 | End: 2022-04-25 | Stop reason: HOSPADM

## 2022-04-25 RX ORDER — FLUMAZENIL 0.1 MG/ML
0.2 INJECTION INTRAVENOUS AS NEEDED
Status: DISCONTINUED | OUTPATIENT
Start: 2022-04-25 | End: 2022-04-25 | Stop reason: HOSPADM

## 2022-04-25 RX ORDER — METOCLOPRAMIDE HYDROCHLORIDE 5 MG/ML
10 INJECTION INTRAMUSCULAR; INTRAVENOUS ONCE AS NEEDED
Status: DISCONTINUED | OUTPATIENT
Start: 2022-04-25 | End: 2022-04-25 | Stop reason: HOSPADM

## 2022-04-25 RX ORDER — DROPERIDOL 2.5 MG/ML
0.62 INJECTION, SOLUTION INTRAMUSCULAR; INTRAVENOUS ONCE AS NEEDED
Status: DISCONTINUED | OUTPATIENT
Start: 2022-04-25 | End: 2022-04-25 | Stop reason: HOSPADM

## 2022-04-25 RX ORDER — FENTANYL CITRATE 50 UG/ML
50 INJECTION, SOLUTION INTRAMUSCULAR; INTRAVENOUS
Status: DISCONTINUED | OUTPATIENT
Start: 2022-04-25 | End: 2022-04-25 | Stop reason: HOSPADM

## 2022-04-25 RX ORDER — ACETAMINOPHEN 325 MG/1
650 TABLET ORAL ONCE AS NEEDED
Status: DISCONTINUED | OUTPATIENT
Start: 2022-04-25 | End: 2022-04-25 | Stop reason: HOSPADM

## 2022-04-25 RX ORDER — NALOXONE HCL 0.4 MG/ML
0.4 VIAL (ML) INJECTION AS NEEDED
Status: DISCONTINUED | OUTPATIENT
Start: 2022-04-25 | End: 2022-04-25 | Stop reason: HOSPADM

## 2022-04-25 RX ORDER — SODIUM CHLORIDE 0.9 % (FLUSH) 0.9 %
10 SYRINGE (ML) INJECTION AS NEEDED
Status: DISCONTINUED | OUTPATIENT
Start: 2022-04-25 | End: 2022-04-25 | Stop reason: HOSPADM

## 2022-04-25 RX ORDER — LABETALOL HYDROCHLORIDE 5 MG/ML
5 INJECTION, SOLUTION INTRAVENOUS
Status: DISCONTINUED | OUTPATIENT
Start: 2022-04-25 | End: 2022-04-25 | Stop reason: HOSPADM

## 2022-04-25 RX ORDER — FENTANYL CITRATE 50 UG/ML
25 INJECTION, SOLUTION INTRAMUSCULAR; INTRAVENOUS
Status: DISCONTINUED | OUTPATIENT
Start: 2022-04-25 | End: 2022-04-25 | Stop reason: HOSPADM

## 2022-04-25 RX ORDER — GLYCOPYRROLATE 0.2 MG/ML
INJECTION INTRAMUSCULAR; INTRAVENOUS AS NEEDED
Status: DISCONTINUED | OUTPATIENT
Start: 2022-04-25 | End: 2022-04-25 | Stop reason: SURG

## 2022-04-25 RX ORDER — LIDOCAINE HYDROCHLORIDE 20 MG/ML
INJECTION, SOLUTION EPIDURAL; INFILTRATION; INTRACAUDAL; PERINEURAL AS NEEDED
Status: DISCONTINUED | OUTPATIENT
Start: 2022-04-25 | End: 2022-04-25 | Stop reason: SURG

## 2022-04-25 RX ORDER — DEXAMETHASONE SODIUM PHOSPHATE 4 MG/ML
INJECTION, SOLUTION INTRA-ARTICULAR; INTRALESIONAL; INTRAMUSCULAR; INTRAVENOUS; SOFT TISSUE AS NEEDED
Status: DISCONTINUED | OUTPATIENT
Start: 2022-04-25 | End: 2022-04-25 | Stop reason: SURG

## 2022-04-25 RX ORDER — HYDROCODONE BITARTRATE AND ACETAMINOPHEN 7.5; 325 MG/1; MG/1
1 TABLET ORAL EVERY 6 HOURS PRN
Status: CANCELLED | OUTPATIENT
Start: 2022-04-25 | End: 2022-05-02

## 2022-04-25 RX ORDER — OXYCODONE HYDROCHLORIDE 5 MG/1
15 TABLET ORAL EVERY 4 HOURS PRN
Status: DISCONTINUED | OUTPATIENT
Start: 2022-04-25 | End: 2022-04-25 | Stop reason: HOSPADM

## 2022-04-25 RX ORDER — BUPIVACAINE HYDROCHLORIDE 5 MG/ML
INJECTION, SOLUTION PERINEURAL AS NEEDED
Status: DISCONTINUED | OUTPATIENT
Start: 2022-04-25 | End: 2022-04-25 | Stop reason: HOSPADM

## 2022-04-25 RX ORDER — MEPERIDINE HYDROCHLORIDE 25 MG/ML
12.5 INJECTION INTRAMUSCULAR; INTRAVENOUS; SUBCUTANEOUS
Status: DISCONTINUED | OUTPATIENT
Start: 2022-04-25 | End: 2022-04-25 | Stop reason: HOSPADM

## 2022-04-25 RX ORDER — PROPOFOL 10 MG/ML
INJECTION, EMULSION INTRAVENOUS AS NEEDED
Status: DISCONTINUED | OUTPATIENT
Start: 2022-04-25 | End: 2022-04-25 | Stop reason: SURG

## 2022-04-25 RX ORDER — HYDRALAZINE HYDROCHLORIDE 20 MG/ML
5 INJECTION INTRAMUSCULAR; INTRAVENOUS
Status: DISCONTINUED | OUTPATIENT
Start: 2022-04-25 | End: 2022-04-25 | Stop reason: HOSPADM

## 2022-04-25 RX ORDER — PROMETHAZINE HYDROCHLORIDE 25 MG/1
25 SUPPOSITORY RECTAL ONCE AS NEEDED
Status: DISCONTINUED | OUTPATIENT
Start: 2022-04-25 | End: 2022-04-25 | Stop reason: HOSPADM

## 2022-04-25 RX ORDER — OXYCODONE HYDROCHLORIDE 5 MG/1
10 TABLET ORAL ONCE AS NEEDED
Status: DISCONTINUED | OUTPATIENT
Start: 2022-04-25 | End: 2022-04-25 | Stop reason: HOSPADM

## 2022-04-25 RX ORDER — PHENYLEPHRINE HCL IN 0.9% NACL 1 MG/10 ML
SYRINGE (ML) INTRAVENOUS AS NEEDED
Status: DISCONTINUED | OUTPATIENT
Start: 2022-04-25 | End: 2022-04-25 | Stop reason: SURG

## 2022-04-25 RX ORDER — ONDANSETRON 2 MG/ML
INJECTION INTRAMUSCULAR; INTRAVENOUS AS NEEDED
Status: DISCONTINUED | OUTPATIENT
Start: 2022-04-25 | End: 2022-04-25 | Stop reason: SURG

## 2022-04-25 RX ORDER — HYDROCODONE BITARTRATE AND ACETAMINOPHEN 7.5; 325 MG/1; MG/1
1 TABLET ORAL EVERY 6 HOURS PRN
Qty: 28 TABLET | Refills: 0 | Status: SHIPPED | OUTPATIENT
Start: 2022-04-25 | End: 2022-05-09

## 2022-04-25 RX ORDER — FENTANYL CITRATE 50 UG/ML
INJECTION, SOLUTION INTRAMUSCULAR; INTRAVENOUS AS NEEDED
Status: DISCONTINUED | OUTPATIENT
Start: 2022-04-25 | End: 2022-04-25 | Stop reason: SURG

## 2022-04-25 RX ADMIN — GLYCOPYRROLATE 0.4 MG: 0.2 INJECTION INTRAMUSCULAR; INTRAVENOUS at 12:21

## 2022-04-25 RX ADMIN — FENTANYL CITRATE 25 MCG: 50 INJECTION, SOLUTION INTRAMUSCULAR; INTRAVENOUS at 13:26

## 2022-04-25 RX ADMIN — Medication 100 MCG: at 13:01

## 2022-04-25 RX ADMIN — SODIUM CHLORIDE, POTASSIUM CHLORIDE, SODIUM LACTATE AND CALCIUM CHLORIDE 1000 ML: 600; 310; 30; 20 INJECTION, SOLUTION INTRAVENOUS at 10:05

## 2022-04-25 RX ADMIN — ONDANSETRON 4 MG: 2 INJECTION INTRAMUSCULAR; INTRAVENOUS at 13:26

## 2022-04-25 RX ADMIN — CEFAZOLIN 2 G: 2 INJECTION, POWDER, FOR SOLUTION INTRAMUSCULAR; INTRAVENOUS at 12:20

## 2022-04-25 RX ADMIN — Medication 100 MCG: at 12:33

## 2022-04-25 RX ADMIN — Medication 100 MCG: at 13:13

## 2022-04-25 RX ADMIN — Medication 200 MCG: at 12:21

## 2022-04-25 RX ADMIN — FENTANYL CITRATE 25 MCG: 50 INJECTION, SOLUTION INTRAMUSCULAR; INTRAVENOUS at 12:58

## 2022-04-25 RX ADMIN — OXYCODONE 15 MG: 5 TABLET ORAL at 15:26

## 2022-04-25 RX ADMIN — Medication 100 MCG: at 12:28

## 2022-04-25 RX ADMIN — PROPOFOL 150 MG: 10 INJECTION, EMULSION INTRAVENOUS at 12:15

## 2022-04-25 RX ADMIN — LIDOCAINE HYDROCHLORIDE 100 MG: 20 INJECTION, SOLUTION EPIDURAL; INFILTRATION; INTRACAUDAL at 12:15

## 2022-04-25 RX ADMIN — Medication 100 MCG: at 12:49

## 2022-04-25 RX ADMIN — DEXAMETHASONE SODIUM PHOSPHATE 4 MG: 4 INJECTION, SOLUTION INTRAMUSCULAR; INTRAVENOUS at 12:19

## 2022-04-25 RX ADMIN — Medication 100 MCG: at 13:24

## 2022-04-25 RX ADMIN — FENTANYL CITRATE 50 MCG: 50 INJECTION, SOLUTION INTRAMUSCULAR; INTRAVENOUS at 12:15

## 2022-04-25 NOTE — ANESTHESIA POSTPROCEDURE EVALUATION
Patient: Teri Hanna    Procedure Summary     Date: 04/25/22 Room / Location: [facility] OR 08 / [facility] MAIN OR    Anesthesia Start: 1210 Anesthesia Stop: 1353    Procedure: METATARSOPHALANGEAL JOINT FUSION (Right Foot) Diagnosis:       Acquired hallux rigidus, right      (Acquired hallux rigidus, right [M20.21])    Surgeons: CAMERON Cabrales DPM Provider: Benson Aguirre MD    Anesthesia Type: general ASA Status: 3          Anesthesia Type: general    Vitals  Vitals Value Taken Time   /71 04/25/22 1427   Temp 97.8 °F (36.6 °C) 04/25/22 1350   Pulse 67 04/25/22 1428   Resp 8 04/25/22 1420   SpO2 97 % 04/25/22 1428   Vitals shown include unvalidated device data.        Post Anesthesia Care and Evaluation    Patient location during evaluation: PACU  Patient participation: complete - patient participated  Level of consciousness: awake  Pain scale: See nurse's notes for pain score.  Pain management: adequate  Airway patency: patent  Anesthetic complications: No anesthetic complications  PONV Status: none  Cardiovascular status: acceptable  Respiratory status: acceptable  Hydration status: acceptable    Comments: Patient seen and examined postoperatively; vital signs stable; SpO2 greater than or equal to 90%; cardiopulmonary status stable; nausea/vomiting adequately controlled; pain adequately controlled; no apparent anesthesia complications; patient discharged from anesthesia care when discharge criteria were met

## 2022-04-25 NOTE — OP NOTE
Operative Note   Foot and Ankle Surgery   Provider: Dr. Javier Cabrales   Location: [facility]      Procedure:  1.  First metatarsal phalangeal joint arthrodesis, right    Pre-operative Diagnosis:   1.  Hallux rigidus, right  2.  Hallux valgus, right  3.  Chronic right foot pain    Post-operative Diagnosis: Same    Surgeon: Javier Cabrales    Assistant: None    Anesthesia: General    Implants: [employer] first metatarsophalangeal joint plate and 3.5 mm locking and nonlocking screws; cannulated 4.0 millimeter screw    Findings: Degenerative changes involving the first metatarsal phalangeal joint as expected.    Specimen: None    Blood Loss: Less than 5cc    Complications: None    Post Op Plan: Discharge home.  Strict nonweightbearing activity.  Follow-up with me in 2 weeks    Summary:    Patient is a 71-year-old female who is been seen in office for chronic issues involving her right foot.  She complains of pain and limitation involving her great toe joint.  She also has baseline stability issues.  We discussed treatment options and reviewed first metatarsal phalangeal joint for definitive treatment option.  She understands that she will require nonweightbearing activity and decreased overall activity after surgery.    Procedure, risks, complications, and goals were discussed with the patient at bedside.  Risks include but are not limited to infection, complications from anesthesia (including death), chronic pain or numbness, hematoma/seroma, deep vein thrombosis, wound complications, and potential for additional surgical procedures.  Patient understands and elects to proceed with surgery at this time. Informed consent was obtained before proceeding to the operating suite.  All questions were answered to the patient's satisfaction. No guarantees or assurances were given or implied.    Procedure:    Patient was taken to the operating room placed on the operative table in a supine position. Once adequate  general anesthesia was administered, a pneumatic tourniquet was placed about the patient's operative calf.  The foot was scrubbed prepped and draped in usual sterile fashion.  The limb was elevated exsanguinated and the pneumatic tourniquet was inflated to 250 mm of mercury.  A formal time-out was conducted prior skin incision.    Attention was then directed to the first metatarsophalangeal joint region.  A linear longitudinal incision was made parallel and medial to the extensor hallucis longus tendon overlying the joint. The incision was opened in layers.  Vital structures were preserved and bleeders were cauterized as needed.  A linear capsulotomy was performed gaining access to the joint. Soft tissue dissection was performed showing significant degenerative changes to the joint space.  The capsule was preserved for closure but the bulk from all redundant tissue.  The joint space was adequately visualized showing greater than 50% articular cartilage loss to both the head of the first metatarsal and base of the proximal phalanx.  A rongeur was used to remove all osteophytes an exostosis from the base of the proximal phalanx as well as periphery of the first metatarsal.  The joint was prepped in a standard fashion utilizing reaming.  A K-wire was placed into the head of the first metatarsal.  Fluoroscopy was used to ensure adequate alignment.  Cup Reamer was then used to denuded all articular cartilage from the head of the first metatarsal while decompressing.  Subchondral bone was achieved and similar joint prep was performed to the base of the proximal phalanx with a cone Reamer.  All redundant bone was removed with a rongeur.  The subchondral bone was then drilled with a 2-0 drill bit to both sides of the joint.    Once the joint was adequately prepped, temporary fixation was achieved and alignment was checked under fluoroscopic guidance.  Definitive fixation was achieved utilizing a cannulated 4.0 millimeter  screw from a distal to proximal orientation across the joint.  The fixation was then neutralized with a dorsal compression plate.  The plate was secured with locking and nonlocking 3.5 millimeter screws of various lengths.  Final imaging was performed showing excellent reduction and fixation involving the joint.  The wound was irrigated with normal saline.  The capsule was reapproximated with a 2-0 Vicryl in a simple interrupted manner.  The skin was reapproximated with a 4-0 Vicryl in a similar fashion.  The skin was repaired with 3-0 nylon.    A postoperative block was performed utilizing 20 cc of 0.5% Marcaine plain.The incision was dressed with xeroform and sterile compressive dressings.  The tourniquet was released and a prompt hyperemic response was noted to all digits of the foot. Patient tolerated the procedure and anesthesia well. She was transferred from the operating room to the recovery room with vital signs stable and neurovascular status intact to the operative extremity.        Dr. Javier Cabrales, DPM  [facility]  [phone]    Note is dictated utilizing voice recognition software. Unfortunately this leads to occasional typographical errors. I apologize in advance if the situation occurs. If questions occur please do not hesitate to call our office.

## 2022-04-25 NOTE — ANESTHESIA PREPROCEDURE EVALUATION
Anesthesia Evaluation     Patient summary reviewed and Nursing notes reviewed                Airway   Mallampati: I  TM distance: >3 FB  Neck ROM: full  No difficulty expected  Dental - normal exam     Pulmonary - normal exam   (+) shortness of breath, sleep apnea,   Cardiovascular - normal exam    (+) hypertension, hyperlipidemia,     ROS comment: NL STRESS    Neuro/Psych  (+) seizures, TIA, headaches, dizziness/light headedness, psychiatric history Anxiety, dementia,    GI/Hepatic/Renal/Endo    (+)   diabetes mellitus, thyroid problem hypothyroidism    Musculoskeletal (-) negative ROS    Abdominal  - normal exam    Bowel sounds: normal.   Substance History - negative use     OB/GYN negative ob/gyn ROS         Other                        Anesthesia Plan    ASA 3     general   (LMA)          CODE STATUS:

## 2022-04-25 NOTE — ANESTHESIA PROCEDURE NOTES
Airway  Urgency: elective    Date/Time: 4/25/2022 12:16 PM  Airway not difficult    General Information and Staff    Patient location during procedure: OR  CRNA: [facility] CAA    Indications and Patient Condition  Indications for airway management: airway protection    Preoxygenated: yes  MILS maintained throughout  Mask difficulty assessment: 0 - not attempted    Final Airway Details  Final airway type: supraglottic airway      Successful airway: unique  Size 4    Number of attempts at approach: 1  Assessment: lips, teeth, and gum same as pre-op and atraumatic intubation

## 2022-05-09 ENCOUNTER — OFFICE VISIT (OUTPATIENT)
Dept: PODIATRY | Facility: CLINIC | Age: 72
End: 2022-05-09

## 2022-05-09 VITALS
SYSTOLIC BLOOD PRESSURE: 146 MMHG | DIASTOLIC BLOOD PRESSURE: 69 MMHG | HEIGHT: 63 IN | WEIGHT: 136 LBS | BODY MASS INDEX: 24.1 KG/M2 | HEART RATE: 74 BPM

## 2022-05-09 DIAGNOSIS — M20.21 ACQUIRED HALLUX RIGIDUS, RIGHT: Primary | ICD-10-CM

## 2022-05-09 PROCEDURE — 99024 POSTOP FOLLOW-UP VISIT: CPT | Performed by: PODIATRIST

## 2022-05-09 NOTE — PROGRESS NOTES
05/09/2022  Foot and Ankle Surgery - New Patient   Provider: Dr. Javier Cabrales DPM  Location: [facility]    Subjective:  Teri Hanna is a 71 y.o. female.     Chief Complaint   Patient presents with   • Right Foot - Pain   • Post-op     Last pcp appt with dawson villa  3/1/2022       HPI: The patient presents to the office today for a post-operative follow-up.     The patient is 2 weeks status post first MTPJ fusion. She is doing well and denies any prominent pain  Allergies   Allergen Reactions   • Meloxicam GI Bleeding     Dr's do not want pt on this medication due to Hx of TIA   • Triptans Nausea And Vomiting and Shortness Of Breath   • Ephedrine Swelling     SWELLING       Past Medical History:   Diagnosis Date   • Arthritis    • Dementia (HCC)    • Depression    • Diabetes (HCC)    • Dizziness    • Hyperlipemia    • Hypertension    • Knee pain, bilateral    • Low back pain    • Migraine    • Rotator cuff disorder     BILATERAL   • Shoulder pain, bilateral    • Sleep apnea    • Thyroid disease    • TIA (transient ischemic attack) 2018       Past Surgical History:   Procedure Laterality Date   • BACK SURGERY     • BUNIONECTOMY Left 6/28/2021    Procedure: BUNIONECTOMY KARRIE MINIMALLY INVASIVE WITH INTERNAL FIXATION AND HALLUX NAIL AVULSION WITH CHEMICAL MATRIXECTOMY;  Surgeon: CAMERON Cabrales DPM;  Location: [facility] OR;  Service: Podiatry;  Laterality: Left;   • CARPAL TUNNEL RELEASE      RT WRIST   • ENDOSCOPY     • FOOT FUSION Right 4/25/2022    Procedure: METATARSOPHALANGEAL JOINT FUSION;  Surgeon: CAMERON Cabrales DPM;  Location: [facility] MA[facility] OR;  Service: Podiatry;  Laterality: Right;   • HAMMER TOE REPAIR Left 6/28/2021    Procedure: HAMMER TOE REPAIR SECOND TOE;  Surgeon: CAMERON Cabrales DPM;  Location: [facility] MA[facility] OR;  Service: Podiatry;  Laterality: Left;   • RETINAL DETACHMENT SURGERY     • TUBAL ABDOMINAL LIGATION         Family History   Problem Relation Age of Onset   •  Seizures Mother    • Cancer Father    • Heart disease Father        Social History     Socioeconomic History   • Marital status:    Tobacco Use   • Smoking status: Never Smoker   • Smokeless tobacco: Never Used   Vaping Use   • Vaping Use: Never used   Substance and Sexual Activity   • Alcohol use: Yes     Comment: OCC  BEER   • Drug use: Defer   • Sexual activity: Defer        Current Outpatient Medications on File Prior to Visit   Medication Sig Dispense Refill   • albuterol sulfate  (90 Base) MCG/ACT inhaler Inhale 2 puffs Every 4 (Four) Hours As Needed. Inhale 2 puffs by mouth every 4 to 6 hours as needed to bring     • Butalbital-APAP-Caffeine -40 MG per capsule   2   • Cholecalciferol (VITAMIN D) 1000 units tablet Take 2,000 Units by mouth Daily. LD 6/23     • clopidogrel (PLAVIX) 75 MG tablet Take 75 mg by mouth Daily. LD 4/13 from Dr. Cabrales     • Coenzyme Q10 (COQ10) 200 MG capsule 1 capsule Daily.     • diclofenac (VOLTAREN) 1 % gel gel DICLOFENAC SODIUM 1 % GEL     • famotidine (PEPCID) 20 MG tablet Take 20 mg by mouth 2 (Two) Times a Day. Take dos     • galantamine (RAZADYNE) 8 MG tablet Take 4 mg by mouth 2 (Two) Times a Day. Take dos     • HYDROcodone-acetaminophen (NORCO) 5-325 MG per tablet Take 1 tablet by mouth Every 6 (Six) Hours As Needed. for pain     • lamoTRIgine (LaMICtal) 100 MG tablet Take 100 mg by mouth 2 (Two) Times a Day. Take 1 in am 1/2 at night  0   • levothyroxine (SYNTHROID, LEVOTHROID) 150 MCG tablet Take 150 mcg by mouth Daily.     • memantine (NAMENDA) 10 MG tablet Take 10 mg by mouth 2 (Two) Times a Day. Take dos  5   • metFORMIN (GLUCOPHAGE) 1000 MG tablet Take 1,000 mg by mouth 2 (Two) Times a Day. HOLD 48 hours before surgery  3   • mirtazapine (REMERON) 15 MG tablet Take 15 mg by mouth every night at bedtime.     • ondansetron ODT (ZOFRAN-ODT) 4 MG disintegrating tablet DISSOLVE 1 TABLET ON THE TONGUE THREE TIMES DAILY AS NEEDED     • promethazine  "(PHENERGAN) 12.5 MG tablet Take 25 mg by mouth Every 8 (Eight) Hours As Needed.     • REPATHA SURECLICK 140 MG/ML solution auto-injector Inject 140 mg under the skin into the appropriate area as directed Every 14 (Fourteen) Days.     • ubrogepant 100 MG tablet 100 mg As Needed.     • venlafaxine XR (EFFEXOR-XR) 37.5 MG 24 hr capsule Take 37.5 mg by mouth Every Night.     • venlafaxine XR (EFFEXOR-XR) 75 MG 24 hr capsule 75 mg Every Morning.  0     No current facility-administered medications on file prior to visit.       Objective   /69   Pulse 74   Ht 160 cm (63\")   Wt 61.7 kg (136 lb)   BMI 24.09 kg/m²     Foot/Ankle Exam:       General:   Appearance: appears stated age and healthy    Orientation: AAOx3    Affect: appropriate    Gait: antalgic      VASCULAR      Right Foot Vascularity   Normal vascular exam    Dorsalis pedis:  2+  Posterior tibial:  2+  Skin Temperature: warm    Edema Grading:  None  CFT:  < 3 seconds  Pedal Hair Growth:  Present  Varicosities: none       Left Foot Vascularity   Normal vascular exam    Dorsalis pedis:  2+  Posterior tibial:  2+  Skin Temperature: warm    Edema Grading:  None  CFT:  < 3 seconds  Pedal Hair Growth:  Present  Varicosities: none        NEUROLOGIC     Right Foot Neurologic   Light touch sensation:  Normal  Hot/Cold sensation: normal    Achilles reflex:  2+     Left Foot Neurologic   Light touch sensation:  Normal  Hot/cold sensation: normal    Achilles reflex:  2+     MUSCULOSKELETAL      Right Foot Musculoskeletal   Ecchymosis:  None  Tenderness: great toe metatarsophalangeal joint    Arch:  Pes planus  Hallux limitus: Yes       Left Foot Musculoskeletal   Ecchymosis:  None  Tenderness: toe 2 and 2nd MTJP    Arch:  Pes planus  Hammertoe:  Second toe, third toe, fourth toe and fifth toe  Hallux valgus: Yes       MUSCLE STRENGTH     Right Foot Muscle Strength   Normal strength    Foot dorsiflexion:  5  Foot plantar flexion:  5  Foot inversion:  5  Foot " eversion:  5     Left Foot Muscle Strength   Normal strength    Foot dorsiflexion:  5  Foot plantar flexion:  5  Foot inversion:  5  Foot eversion:  5     DERMATOLOGIC     Right Foot Dermatologic   Skin comment:   Incision site is clean, dry and intact with suture. No dehiscence or signs of infection.     Left Foot Dermatologic   Skin: skin intact and atrophic    Skin: no left foot redness and no left foot ulcer       TESTS     Right Foot Tests   Anterior drawer: negative    Varus tilt: negative       Left Foot Tests   Anterior drawer: negative    Varus tilt: negative          Diagnoses and all orders for this visit:    1. Acquired hallux rigidus, right (Primary)        Sutures were removed today. Patient is able to shower and bathe. Dressing is not necessary, unless she begins to develop some spotting from bleeding of the suture sites today. I explained that her toe is going to be stiff and recommend that she bend the other toes and move the rest of the foot as much as she can. She is to remain in the boot for added protection and continue to use the knee scooter. She will return to the office in 4 weeks with repeat x-rays. At that point, we will discuss ambulating in the boot.     No orders of the defined types were placed in this encounter.       Note is dictated utilizing voice recognition software. Unfortunately this leads to occasional typographical errors. I apologize in advance if the situation occurs. If questions occur please do not hesitate to call our office.    Transcribed from ambient dictation for CAMERON Cabrales DPM by Vania Reilly.  05/09/22   12:16 EDT    Patient verbalized consent to the visit recording.

## 2022-06-06 ENCOUNTER — OFFICE VISIT (OUTPATIENT)
Dept: PODIATRY | Facility: CLINIC | Age: 72
End: 2022-06-06

## 2022-06-06 VITALS
SYSTOLIC BLOOD PRESSURE: 136 MMHG | DIASTOLIC BLOOD PRESSURE: 67 MMHG | HEART RATE: 64 BPM | WEIGHT: 136 LBS | HEIGHT: 63 IN | BODY MASS INDEX: 24.1 KG/M2

## 2022-06-06 DIAGNOSIS — M20.21 ACQUIRED HALLUX RIGIDUS, RIGHT: Primary | ICD-10-CM

## 2022-06-06 PROCEDURE — 99024 POSTOP FOLLOW-UP VISIT: CPT | Performed by: PODIATRIST

## 2022-06-06 NOTE — PROGRESS NOTES
06/06/2022  Foot and Ankle Surgery - Established Patient/Follow-up  Provider: Dr. Javier Cabrales, EDSON  Location: [facility]    Subjective:  Teri Hanna is a 71 y.o. female.     Chief Complaint   Patient presents with   • Right Foot - Pain   • Follow-up     SHARON Rasheed aprn   3/1/2022       HPI:The patient presents today approximately 6 weeks status post right metatarsophalangeal joint fusion.     She reports doing well. The patient states that she has not been bearing weight on her right foot for the most part, but that she has put some weight on it to catch herself when she starts to falls.    Allergies   Allergen Reactions   • Meloxicam GI Bleeding     Dr's do not want pt on this medication due to Hx of TIA   • Triptans Nausea And Vomiting and Shortness Of Breath   • Ephedrine Swelling     SWELLING       Current Outpatient Medications on File Prior to Visit   Medication Sig Dispense Refill   • albuterol sulfate  (90 Base) MCG/ACT inhaler Inhale 2 puffs Every 4 (Four) Hours As Needed. Inhale 2 puffs by mouth every 4 to 6 hours as needed to bring     • Butalbital-APAP-Caffeine -40 MG per capsule   2   • Cholecalciferol (VITAMIN D) 1000 units tablet Take 2,000 Units by mouth Daily. LD 6/23     • clopidogrel (PLAVIX) 75 MG tablet Take 75 mg by mouth Daily. LD 4/13 from Dr. Cabrales     • Coenzyme Q10 (COQ10) 200 MG capsule 1 capsule Daily.     • diclofenac (VOLTAREN) 1 % gel gel DICLOFENAC SODIUM 1 % GEL     • famotidine (PEPCID) 20 MG tablet Take 20 mg by mouth 2 (Two) Times a Day. Take dos     • galantamine (RAZADYNE) 8 MG tablet Take 4 mg by mouth 2 (Two) Times a Day. Take dos     • HYDROcodone-acetaminophen (NORCO) 5-325 MG per tablet Take 1 tablet by mouth Every 6 (Six) Hours As Needed. for pain     • lamoTRIgine (LaMICtal) 100 MG tablet Take 100 mg by mouth 2 (Two) Times a Day. Take 1 in am 1/2 at night  0   • levothyroxine (SYNTHROID, LEVOTHROID) 150 MCG tablet Take 150 mcg by mouth  "Daily.     • memantine (NAMENDA) 10 MG tablet Take 10 mg by mouth 2 (Two) Times a Day. Take dos  5   • metFORMIN (GLUCOPHAGE) 1000 MG tablet Take 1,000 mg by mouth 2 (Two) Times a Day. HOLD 48 hours before surgery  3   • mirtazapine (REMERON) 15 MG tablet Take 15 mg by mouth every night at bedtime.     • ondansetron ODT (ZOFRAN-ODT) 4 MG disintegrating tablet DISSOLVE 1 TABLET ON THE TONGUE THREE TIMES DAILY AS NEEDED     • promethazine (PHENERGAN) 12.5 MG tablet Take 25 mg by mouth Every 8 (Eight) Hours As Needed.     • REPATHA SURECLICK 140 MG/ML solution auto-injector Inject 140 mg under the skin into the appropriate area as directed Every 14 (Fourteen) Days.     • ubrogepant 100 MG tablet 100 mg As Needed.     • venlafaxine XR (EFFEXOR-XR) 37.5 MG 24 hr capsule Take 37.5 mg by mouth Every Night.     • venlafaxine XR (EFFEXOR-XR) 75 MG 24 hr capsule 75 mg Every Morning.  0     No current facility-administered medications on file prior to visit.       Objective   /67   Pulse 64   Ht 160 cm (63\")   Wt 61.7 kg (136 lb)   BMI 24.09 kg/m²     Foot/Ankle Exam:       General:   Appearance: appears stated age and healthy    Orientation: AAOx3    Affect: appropriate    Gait: antalgic      VASCULAR      Right Foot Vascularity   Normal vascular exam    Dorsalis pedis:  2+  Posterior tibial:  2+  Skin Temperature: warm    Edema Grading:  None  CFT:  < 3 seconds  Pedal Hair Growth:  Present  Varicosities: none       Left Foot Vascularity   Normal vascular exam    Dorsalis pedis:  2+  Posterior tibial:  2+  Skin Temperature: warm    Edema Grading:  None  CFT:  < 3 seconds  Pedal Hair Growth:  Present  Varicosities: none        NEUROLOGIC     Right Foot Neurologic   Light touch sensation:  Normal  Hot/Cold sensation: normal    Achilles reflex:  2+     Left Foot Neurologic   Light touch sensation:  Normal  Hot/cold sensation: normal    Achilles reflex:  2+     MUSCULOSKELETAL      Right Foot Musculoskeletal "   Ecchymosis:  None  Tenderness: great toe metatarsophalangeal joint    Arch:  Pes planus  Hallux limitus: Yes       Left Foot Musculoskeletal   Ecchymosis:  None  Tenderness: toe 2 and 2nd MTJP    Arch:  Pes planus  Hammertoe:  Second toe, third toe, fourth toe and fifth toe  Hallux valgus: Yes       MUSCLE STRENGTH     Right Foot Muscle Strength   Normal strength    Foot dorsiflexion:  5  Foot plantar flexion:  5  Foot inversion:  5  Foot eversion:  5     Left Foot Muscle Strength   Normal strength    Foot dorsiflexion:  5  Foot plantar flexion:  5  Foot inversion:  5  Foot eversion:  5     DERMATOLOGIC     Right Foot Dermatologic   Skin comment:  Incision sites are well healed.     Left Foot Dermatologic   Skin: skin intact and atrophic    Skin: no left foot redness and no left foot ulcer       TESTS     Right Foot Tests   Anterior drawer: negative    Varus tilt: negative       Left Foot Tests   Anterior drawer: negative    Varus tilt: negative        Assessment & Plan   Diagnoses and all orders for this visit:    1. Acquired hallux rigidus, right (Primary)  -     XR Foot 3+ View Right      The patient presents today approximately 6 weeks status post right metatarsophalangeal joint fusion.  Imaging was independently reviewed showing stable internal fixation without any definitive osseous bridging.  I have recommended that she start partial protected weightbearing in the cam boot.  She may gradually increase activity as tolerated.  She is to continue with range of motion exercises.  I would like to see her in 4 weeks for reevaluation and imaging.      Orders Placed This Encounter   Procedures   • XR Foot 3+ View Right     Order Specific Question:   Reason for Exam:     Answer:   s/p mtpj fusion    room 13 non wb     Order Specific Question:   Does this patient have a diabetic monitoring/medication delivering device on?     Answer:   No     Order Specific Question:   Release to patient     Answer:   Immediate           Note is dictated utilizing voice recognition software. Unfortunately this leads to occasional typographical errors. I apologize in advance if the situation occurs. If questions occur please do not hesitate to call our office.    Transcribed from ambient dictation for CAMERON Cabrales DPM by Laura Toussaint.  06/06/22   13:41 EDT    Patient verbalized consent to the visit recording.

## 2022-07-05 ENCOUNTER — OFFICE VISIT (OUTPATIENT)
Dept: PODIATRY | Facility: CLINIC | Age: 72
End: 2022-07-05

## 2022-07-05 DIAGNOSIS — M20.21 ACQUIRED HALLUX RIGIDUS, RIGHT: Primary | ICD-10-CM

## 2022-07-05 PROCEDURE — 99024 POSTOP FOLLOW-UP VISIT: CPT | Performed by: PODIATRIST

## 2022-07-05 RX ORDER — BUTALBITAL, ACETAMINOPHEN AND CAFFEINE 50; 325; 40 MG/1; MG/1; MG/1
1 TABLET ORAL EVERY 6 HOURS PRN
COMMUNITY
Start: 2022-05-01

## 2022-07-05 RX ORDER — OMEPRAZOLE 40 MG/1
40 CAPSULE, DELAYED RELEASE ORAL DAILY
COMMUNITY
Start: 2022-05-13

## 2022-07-06 NOTE — PROGRESS NOTES
07/05/2022  Foot and Ankle Surgery - Established Patient/Follow-up  Provider: Dr. Javier Cabrales, EDSON  Location: [facility]    Subjective:  Teri Hanna is a 71 y.o. female.     Chief Complaint   Patient presents with   • Right Foot - Follow-up     Post op    • Post-op     Last saw PCP NIKKIE Smart 5/15/22       HPI: Patient returns approximately 10 weeks after surgery.  She has continued to use the cam boot and presents with a knee scooter today.  She states that she is doing very well without any significant pain or limitation.  No other issues or concerns today    Allergies   Allergen Reactions   • Meloxicam GI Bleeding     Dr's do not want pt on this medication due to Hx of TIA   • Triptans Nausea And Vomiting and Shortness Of Breath   • Ephedrine Swelling     SWELLING       Current Outpatient Medications on File Prior to Visit   Medication Sig Dispense Refill   • albuterol sulfate  (90 Base) MCG/ACT inhaler Inhale 2 puffs Every 4 (Four) Hours As Needed. Inhale 2 puffs by mouth every 4 to 6 hours as needed to bring     • Butalbital-APAP-Caffeine -40 MG per capsule   2   • Cholecalciferol (VITAMIN D) 1000 units tablet Take 2,000 Units by mouth Daily. LD 6/23     • clopidogrel (PLAVIX) 75 MG tablet Take 75 mg by mouth Daily. LD 4/13 from Dr. Cabrales     • Coenzyme Q10 (COQ10) 200 MG capsule 1 capsule Daily.     • diclofenac (VOLTAREN) 1 % gel gel DICLOFENAC SODIUM 1 % GEL     • famotidine (PEPCID) 20 MG tablet Take 20 mg by mouth 2 (Two) Times a Day. Take dos     • galantamine (RAZADYNE) 8 MG tablet Take 4 mg by mouth 2 (Two) Times a Day. Take dos     • HYDROcodone-acetaminophen (NORCO) 5-325 MG per tablet Take 1 tablet by mouth Every 6 (Six) Hours As Needed. for pain     • lamoTRIgine (LaMICtal) 100 MG tablet Take 100 mg by mouth 2 (Two) Times a Day. Take 1 in am 1/2 at night  0   • levothyroxine (SYNTHROID, LEVOTHROID) 150 MCG tablet Take 150 mcg by mouth Daily.     • memantine (NAMENDA)  10 MG tablet Take 10 mg by mouth 2 (Two) Times a Day. Take dos  5   • metFORMIN (GLUCOPHAGE) 1000 MG tablet Take 1,000 mg by mouth 2 (Two) Times a Day. HOLD 48 hours before surgery  3   • mirtazapine (REMERON) 15 MG tablet Take 15 mg by mouth every night at bedtime.     • ondansetron ODT (ZOFRAN-ODT) 4 MG disintegrating tablet DISSOLVE 1 TABLET ON THE TONGUE THREE TIMES DAILY AS NEEDED     • promethazine (PHENERGAN) 12.5 MG tablet Take 25 mg by mouth Every 8 (Eight) Hours As Needed.     • REPATHA SURECLICK 140 MG/ML solution auto-injector Inject 140 mg under the skin into the appropriate area as directed Every 14 (Fourteen) Days.     • ubrogepant 100 MG tablet 100 mg As Needed.     • venlafaxine XR (EFFEXOR-XR) 37.5 MG 24 hr capsule Take 37.5 mg by mouth Every Night.     • venlafaxine XR (EFFEXOR-XR) 75 MG 24 hr capsule 75 mg Every Morning.  0   • butalbital-acetaminophen-caffeine (FIORICET, ESGIC) -40 MG per tablet Take 1 tablet by mouth Every 6 (Six) Hours As Needed.     • omeprazole (priLOSEC) 40 MG capsule Take 40 mg by mouth Daily.       No current facility-administered medications on file prior to visit.       Objective   There were no vitals taken for this visit.    General:   Appearance: appears stated age and healthy    Orientation: AAOx3    Affect: appropriate    Gait: antalgic       VASCULAR       Right Foot Vascularity   Normal vascular exam    Dorsalis pedis:  2+  Posterior tibial:  2+  Skin Temperature: warm    Edema Grading:  None  CFT:  < 3 seconds  Pedal Hair Growth:  Present  Varicosities: none        Left Foot Vascularity   Normal vascular exam    Dorsalis pedis:  2+  Posterior tibial:  2+  Skin Temperature: warm    Edema Grading:  None  CFT:  < 3 seconds  Pedal Hair Growth:  Present  Varicosities: none        NEUROLOGIC      Right Foot Neurologic   Light touch sensation:  Normal  Hot/Cold sensation: normal    Achilles reflex:  2+      Left Foot Neurologic   Light touch sensation:   Normal  Hot/cold sensation: normal    Achilles reflex:  2+      MUSCULOSKELETAL       Right Foot Musculoskeletal   Ecchymosis:  None  Tenderness: great toe metatarsophalangeal joint    Arch:  Pes planus  Hallux limitus: Yes        Left Foot Musculoskeletal   Ecchymosis:  None  Tenderness: toe 2 and 2nd MTJP    Arch:  Pes planus  Hammertoe:  Second toe, third toe, fourth toe and fifth toe  Hallux valgus: Yes        MUSCLE STRENGTH      Right Foot Muscle Strength   Normal strength    Foot dorsiflexion:  5  Foot plantar flexion:  5  Foot inversion:  5  Foot eversion:  5      Left Foot Muscle Strength   Normal strength    Foot dorsiflexion:  5  Foot plantar flexion:  5  Foot inversion:  5  Foot eversion:  5      DERMATOLOGIC      Right Foot Dermatologic   Skin comment:  Incision sites are well healed.     Prominent rigidity noted across the first metatarsal phalangeal joint.  No pain or swelling.    Assessment & Plan   Diagnoses and all orders for this visit:    1. Acquired hallux rigidus, right (Primary)  -     XR Foot 3+ View Right      Patient is doing very well 10 weeks out from surgery.  Imaging was reviewed showing stable internal fixation and interval arthrodesis across the first MTPJ.  I have recommended that she start weightbearing activities in the cam boot.  She has remained nonweightbearing since last exam.  I would like her to discontinue the knee scooter.  She is to wear the cam boot for the next 1 to 2 weeks and then return to her regular shoe.  I would like her to progress activity as possible.  She is to call with any additional issues or concerns and I will see her in 6 weeks for reevaluation and imaging.    Orders Placed This Encounter   Procedures   • XR Foot 3+ View Right     Scheduling Instructions:      Rm 10 10:54am     Order Specific Question:   Reason for Exam:     Answer:   Rt foot pain S/p surgery     Order Specific Question:   Does this patient have a diabetic monitoring/medication  delivering device on?     Answer:   No     Order Specific Question:   Release to patient     Answer:   Immediate          Note is dictated utilizing voice recognition software. Unfortunately this leads to occasional typographical errors. I apologize in advance if the situation occurs. If questions occur please do not hesitate to call our office.

## 2022-08-16 ENCOUNTER — OFFICE VISIT (OUTPATIENT)
Dept: PODIATRY | Facility: CLINIC | Age: 72
End: 2022-08-16

## 2022-08-16 VITALS — BODY MASS INDEX: 24.1 KG/M2 | HEART RATE: 61 BPM | WEIGHT: 136 LBS | HEIGHT: 63 IN | OXYGEN SATURATION: 99 %

## 2022-08-16 DIAGNOSIS — M20.21 ACQUIRED HALLUX RIGIDUS, RIGHT: Primary | ICD-10-CM

## 2022-08-16 PROCEDURE — 99212 OFFICE O/P EST SF 10 MIN: CPT | Performed by: PODIATRIST

## 2022-08-16 RX ORDER — LEVOTHYROXINE SODIUM 112 UG/1
112 TABLET ORAL DAILY
COMMUNITY
Start: 2022-07-28

## 2022-08-16 RX ORDER — EZETIMIBE 10 MG/1
10 TABLET ORAL DAILY
COMMUNITY
Start: 2022-08-10

## 2022-08-16 NOTE — PROGRESS NOTES
08/16/2022  Foot and Ankle Surgery - Established Patient/Follow-up  Provider: Dr. Javier Cabrales, EDSON  Location: [facility]    Subjective:  Teri Hanna is a 72 y.o. female.     Chief Complaint   Patient presents with   • Right Foot - Pain, Follow-up       HPI:     The patient is a 72-year-old female who returns for follow up approximately 6 to 7 weeks status post right foot surgery. She has noticed significant improvement since her last examination. She has been ambulating in a regular shoe without complication.     Allergies   Allergen Reactions   • Meloxicam GI Bleeding     Dr's do not want pt on this medication due to Hx of TIA   • Triptans Nausea And Vomiting and Shortness Of Breath   • Ephedrine Swelling     SWELLING       Current Outpatient Medications on File Prior to Visit   Medication Sig Dispense Refill   • albuterol sulfate  (90 Base) MCG/ACT inhaler Inhale 2 puffs Every 4 (Four) Hours As Needed. Inhale 2 puffs by mouth every 4 to 6 hours as needed to bring     • butalbital-acetaminophen-caffeine (FIORICET, ESGIC) -40 MG per tablet Take 1 tablet by mouth Every 6 (Six) Hours As Needed.     • Butalbital-APAP-Caffeine -40 MG per capsule   2   • Cholecalciferol (VITAMIN D) 1000 units tablet Take 2,000 Units by mouth Daily. LD 6/23     • clopidogrel (PLAVIX) 75 MG tablet Take 75 mg by mouth Daily. LD 4/13 from Dr. Cabrales     • Coenzyme Q10 (COQ10) 200 MG capsule 1 capsule Daily.     • diclofenac (VOLTAREN) 1 % gel gel DICLOFENAC SODIUM 1 % GEL     • ezetimibe (ZETIA) 10 MG tablet Take 10 mg by mouth Daily.     • famotidine (PEPCID) 20 MG tablet Take 20 mg by mouth 2 (Two) Times a Day. Take dos     • galantamine (RAZADYNE) 8 MG tablet Take 4 mg by mouth 2 (Two) Times a Day. Take dos     • HYDROcodone-acetaminophen (NORCO) 5-325 MG per tablet Take 1 tablet by mouth Every 6 (Six) Hours As Needed. for pain     • lamoTRIgine (LaMICtal) 100 MG tablet Take 100 mg by mouth 2 (Two) Times  "a Day. Take 1 in am 1/2 at night  0   • levothyroxine (SYNTHROID, LEVOTHROID) 112 MCG tablet Take 112 mcg by mouth Daily.     • levothyroxine (SYNTHROID, LEVOTHROID) 150 MCG tablet Take 150 mcg by mouth Daily.     • memantine (NAMENDA) 10 MG tablet Take 10 mg by mouth 2 (Two) Times a Day. Take dos  5   • metFORMIN (GLUCOPHAGE) 1000 MG tablet Take 1,000 mg by mouth 2 (Two) Times a Day. HOLD 48 hours before surgery  3   • mirtazapine (REMERON) 15 MG tablet Take 15 mg by mouth every night at bedtime.     • omeprazole (priLOSEC) 40 MG capsule Take 40 mg by mouth Daily.     • ondansetron ODT (ZOFRAN-ODT) 4 MG disintegrating tablet DISSOLVE 1 TABLET ON THE TONGUE THREE TIMES DAILY AS NEEDED     • promethazine (PHENERGAN) 12.5 MG tablet Take 25 mg by mouth Every 8 (Eight) Hours As Needed.     • REPATHA SURECLICK 140 MG/ML solution auto-injector Inject 140 mg under the skin into the appropriate area as directed Every 14 (Fourteen) Days.     • ubrogepant 100 MG tablet 100 mg As Needed.     • venlafaxine XR (EFFEXOR-XR) 37.5 MG 24 hr capsule Take 37.5 mg by mouth Every Night.     • venlafaxine XR (EFFEXOR-XR) 75 MG 24 hr capsule 75 mg Every Morning.  0     No current facility-administered medications on file prior to visit.       Objective   Pulse 61   Ht 160 cm (63\")   Wt 61.7 kg (136 lb)   SpO2 99%   BMI 24.09 kg/m²     Foot/Ankle Exam     General:   Appearance: appears stated age and healthy    Orientation: AAOx3    Affect: appropriate    Gait: antalgic       VASCULAR       Right Foot Vascularity   Normal vascular exam    Dorsalis pedis:  2+  Posterior tibial:  2+  Skin Temperature: warm    Edema Grading:  None  CFT:  < 3 seconds  Pedal Hair Growth:  Present  Varicosities: none        Left Foot Vascularity   Normal vascular exam    Dorsalis pedis:  2+  Posterior tibial:  2+  Skin Temperature: warm    Edema Grading:  None  CFT:  < 3 seconds  Pedal Hair Growth:  Present  Varicosities: none        NEUROLOGIC      Right " Foot Neurologic   Light touch sensation:  Normal  Hot/Cold sensation: normal    Achilles reflex:  2+      Left Foot Neurologic   Light touch sensation:  Normal  Hot/cold sensation: normal    Achilles reflex:  2+      MUSCULOSKELETAL       Right Foot Musculoskeletal   Ecchymosis:  None  Tenderness: great toe metatarsophalangeal joint    Arch:  Pes planus  Hallux limitus: Yes        Left Foot Musculoskeletal   Ecchymosis:  None  Tenderness: toe 2 and 2nd MTJP    Arch:  Pes planus  Hammertoe:  Second toe, third toe, fourth toe and fifth toe  Hallux valgus: Yes        MUSCLE STRENGTH      Right Foot Muscle Strength   Normal strength    Foot dorsiflexion:  5  Foot plantar flexion:  5  Foot inversion:  5  Foot eversion:  5      Left Foot Muscle Strength   Normal strength    Foot dorsiflexion:  5  Foot plantar flexion:  5  Foot inversion:  5  Foot eversion:  5      DERMATOLOGIC      Right Foot Dermatologic   Skin comment:  Incision sites are well healed.     Prominent rigidity noted across the first metatarsal phalangeal joint.  No pain or swelling.    08/16/2022  Prominent rigidity involving the 1st metatarsophalangeal joint as expected.     Assessment & Plan   Diagnoses and all orders for this visit:    1. Acquired hallux rigidus, right (Primary)  -     XR Foot 3+ View Right        The patient is a 72-year-old female who returns for follow up approximately 6 to 7 weeks status post right foot surgery. X-rays of the right foot, taken today, 08/16/2022, were reviewed with the patient revealing that she is healing well at this time. The patient can continue to transition to normal activity with no restrictions.  Patient is to call with any additional issues or concerns.  She has opted to see me on an as-needed basis at this time.    Orders Placed This Encounter   Procedures   • XR Foot 3+ View Right     Order Specific Question:   Reason for Exam:     Answer:   Acquired hallux rigidus, right follow up room 14 wb     Order  Specific Question:   Does this patient have a diabetic monitoring/medication delivering device on?     Answer:   No     Order Specific Question:   Release to patient     Answer:   Routine Release          Note is dictated utilizing voice recognition software. Unfortunately this leads to occasional typographical errors. I apologize in advance if the situation occurs. If questions occur please do not hesitate to call our office.    Transcribed from ambient dictation for CAMERON Cabrales DPM by JULIO CÉSAR CHEN.  08/16/22   13:42 EDT    Patient verbalized consent to the visit recording.  I have personally performed the services described in this document as transcribed by the above individual, and it is both accurate and complete.  CAMERON Cabrales DPM  8/17/2022  07:41 EDT

## 2025-04-02 ENCOUNTER — HOSPITAL ENCOUNTER (EMERGENCY)
Facility: HOSPITAL | Age: 75
Discharge: HOME | End: 2025-04-02
Attending: EMERGENCY MEDICINE
Payer: MEDICARE

## 2025-04-02 ENCOUNTER — APPOINTMENT (OUTPATIENT)
Dept: GENERAL RADIOLOGY | Facility: HOSPITAL | Age: 75
End: 2025-04-02
Payer: MEDICARE

## 2025-04-02 ENCOUNTER — APPOINTMENT (OUTPATIENT)
Dept: CT IMAGING | Facility: HOSPITAL | Age: 75
End: 2025-04-02
Payer: MEDICARE

## 2025-04-02 VITALS
HEIGHT: 62 IN | OXYGEN SATURATION: 97 % | TEMPERATURE: 98.2 F | SYSTOLIC BLOOD PRESSURE: 112 MMHG | BODY MASS INDEX: 23.37 KG/M2 | DIASTOLIC BLOOD PRESSURE: 72 MMHG | WEIGHT: 127 LBS | HEART RATE: 63 BPM | RESPIRATION RATE: 14 BRPM

## 2025-04-02 DIAGNOSIS — S70.02XA CONTUSION OF LEFT HIP, INITIAL ENCOUNTER: ICD-10-CM
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
## 2025-04-02 PROCEDURE — 99284 EMERGENCY DEPT VISIT MOD MDM: CPT

## 2025-04-02 PROCEDURE — 25010000002 MORPHINE PER 10 MG: Performed by: EMERGENCY MEDICINE

## 2025-04-02 PROCEDURE — 25010000002 ONDANSETRON PER 1 MG: Performed by: EMERGENCY MEDICINE

## 2025-04-02 PROCEDURE — 96375 TX/PRO/DX INJ NEW DRUG ADDON: CPT

## 2025-04-02 PROCEDURE — 72192 CT PELVIS W/O DYE: CPT

## 2025-04-02 PROCEDURE — 96374 THER/PROPH/DIAG INJ IV PUSH: CPT

## 2025-04-02 PROCEDURE — 73502 X-RAY EXAM HIP UNI 2-3 VIEWS: CPT

## 2025-04-02 RX ORDER — SODIUM CHLORIDE 0.9 % (FLUSH) 0.9 %
10 SYRINGE (ML) INJECTION AS NEEDED
Status: DISCONTINUED | OUTPATIENT
Start: 2025-04-02 | End: 2025-04-02 | Stop reason: HOSPADM

## 2025-04-02 RX ORDER — MORPHINE SULFATE 2 MG/ML
2 INJECTION, SOLUTION INTRAMUSCULAR; INTRAVENOUS ONCE
Status: COMPLETED | OUTPATIENT
Start: 2025-04-02 | End: 2025-04-02

## 2025-04-02 RX ORDER — ONDANSETRON 2 MG/ML
4 INJECTION INTRAMUSCULAR; INTRAVENOUS ONCE
Status: COMPLETED | OUTPATIENT
Start: 2025-04-02 | End: 2025-04-02

## 2025-04-02 RX ADMIN — MORPHINE SULFATE 2 MG: 2 INJECTION, SOLUTION INTRAMUSCULAR; INTRAVENOUS at 15:40

## 2025-04-02 RX ADMIN — ONDANSETRON 4 MG: 2 INJECTION, SOLUTION INTRAMUSCULAR; INTRAVENOUS at 15:41

## 2025-04-02 NOTE — DISCHARGE INSTRUCTIONS
Ice packs to the sore area.  Return for fever leg swelling toe discolored foot increasing pain unable to bear weight fevers or any other new or worsening problems or concerns return immediately to the ER.  Tylenol for pain

## 2025-04-02 NOTE — ED NOTES
Pt arrived from [facility] by ems. Pt states she was in the shower and slid off her shower chair onto the shower floor this morning. Denies hitting her head or LOC. Pt takes thinners. Pt denies any other complaints.

## 2025-04-02 NOTE — ED PROVIDER NOTES
Subjective   History of Present Illness  Chief complaint fall with left hip pain    History of present illness a 74-year-old female who lives in assisted living she was in the shower on her shower chair when she slid off bed onto the floor.  She complains of pain in the left hip she was able stand up and walk.  She was brought in for evaluation.  Denies any head injury neck or back pain or other complaints no numbness or ting no previous hip surgeries.      Review of Systems   Constitutional:  Negative for chills and fever.   Respiratory:  Negative for chest tightness and shortness of breath.    Gastrointestinal:  Negative for abdominal pain and vomiting.   Musculoskeletal:  Negative for back pain and neck pain.   Neurological:  Negative for numbness.       Past Medical History:   Diagnosis Date    Arthritis     Dementia     Depression     Diabetes     Dizziness     Hyperlipemia     Hypertension     Knee pain, bilateral     Low back pain     Migraine     Rotator cuff disorder     BILATERAL    Shoulder pain, bilateral     Sleep apnea     Thyroid disease     TIA (transient ischemic attack) 2018       Allergies   Allergen Reactions    Meloxicam GI Bleeding, Other (See Comments) and Rash     Dr's do not want pt on this medication due to Hx of TIA    Dr's do not want pt on this medication due to Hx of TIA      Other Reaction(s): GI Bleeding      <paragraph>'s do not want pt on this medication due to Hx of TIA</paragraph>    Triptans Nausea And Vomiting and Shortness Of Breath    Ephedrine Swelling     SWELLING       Past Surgical History:   Procedure Laterality Date    BACK SURGERY      BUNIONECTOMY Left 6/28/2021    Procedure: BUNIONECTOMY KARRIE MINIMALLY INVASIVE WITH INTERNAL FIXATION AND HALLUX NAIL AVULSION WITH CHEMICAL MATRIXECTOMY;  Surgeon: CAMERON Cabrales DPM;  Location: [facility] MAIN OR;  Service: Podiatry;  Laterality: Left;    CARPAL TUNNEL RELEASE      RT WRIST    ENDOSCOPY      FOOT FUSION Right  4/25/2022    Procedure: METATARSOPHALANGEAL JOINT FUSION;  Surgeon: CAMERON Cabrales DPM;  Location: [facility] MAIN OR;  Service: Podiatry;  Laterality: Right;    HAMMER TOE REPAIR Left 6/28/2021    Procedure: HAMMER TOE REPAIR SECOND TOE;  Surgeon: CAMERON Cabrales DPM;  Location: [facility] MAIN OR;  Service: Podiatry;  Laterality: Left;    RETINAL DETACHMENT SURGERY      TUBAL ABDOMINAL LIGATION         Family History   Problem Relation Age of Onset    Seizures Mother     Cancer Father     Heart disease Father        Social History     Socioeconomic History    Marital status:    Tobacco Use    Smoking status: Never    Smokeless tobacco: Never   Vaping Use    Vaping status: Never Used   Substance and Sexual Activity    Alcohol use: Yes     Comment: OCC  BEER    Drug use: Defer    Sexual activity: Defer     Prior to Admission medications    Medication Sig Start Date End Date Taking? Authorizing Provider   albuterol sulfate  (90 Base) MCG/ACT inhaler Inhale 2 puffs Every 4 (Four) Hours As Needed. Inhale 2 puffs by mouth every 4 to 6 hours as needed to bring 5/5/21   Delvin Henry MD   butalbital-acetaminophen-caffeine (FIORICET, ESGIC) -40 MG per tablet Take 1 tablet by mouth Every 6 (Six) Hours As Needed. 5/1/22   Delvin Henry MD   Butalbital-APAP-Caffeine -40 MG per capsule  6/19/19   Delvin Henry MD   Cholecalciferol (VITAMIN D) 1000 units tablet Take 2,000 Units by mouth Daily. LD 6/23    Delvin Henry MD   clopidogrel (PLAVIX) 75 MG tablet Take 75 mg by mouth Daily. LD 4/13 from Dr. Cabrales 1/10/19   Delvin Henry MD   Coenzyme Q10 (COQ10) 200 MG capsule 1 capsule Daily. 1/10/19   Delvin Henry MD   diclofenac (VOLTAREN) 1 % gel gel DICLOFENAC SODIUM 1 % GEL 1/10/19   Delvin Henry MD   ezetimibe (ZETIA) 10 MG tablet Take 10 mg by mouth Daily. 8/10/22   Delvin Henry MD   famotidine (PEPCID) 20 MG tablet Take 20 mg by mouth 2  (Two) Times a Day. Take dos 4/29/21   Delvin Henry MD   galantamine (RAZADYNE) 8 MG tablet Take 4 mg by mouth 2 (Two) Times a Day. Take dos 5/25/21   Delvin Henry MD   HYDROcodone-acetaminophen (NORCO) 5-325 MG per tablet Take 1 tablet by mouth Every 6 (Six) Hours As Needed. for pain 11/17/21   Delvin Henry MD   lamoTRIgine (LaMICtal) 100 MG tablet Take 100 mg by mouth 2 (Two) Times a Day. Take 1 in am 1/2 at night 6/27/19   Delvin Henry MD   levothyroxine (SYNTHROID, LEVOTHROID) 112 MCG tablet Take 112 mcg by mouth Daily. 7/28/22   Delvin Henry MD   levothyroxine (SYNTHROID, LEVOTHROID) 150 MCG tablet Take 150 mcg by mouth Daily. 12/31/21   Delvin Henry MD   memantine (NAMENDA) 10 MG tablet Take 10 mg by mouth 2 (Two) Times a Day. Take dos 9/13/19   Delvin Henry MD   metFORMIN (GLUCOPHAGE) 1000 MG tablet Take 1,000 mg by mouth 2 (Two) Times a Day. HOLD 48 hours before surgery 9/4/19   Delvin Henry MD   mirtazapine (REMERON) 15 MG tablet Take 15 mg by mouth every night at bedtime. 6/10/21   Delvin Henry MD   omeprazole (priLOSEC) 40 MG capsule Take 40 mg by mouth Daily. 5/13/22   Delvin Henry MD   ondansetron ODT (ZOFRAN-ODT) 4 MG disintegrating tablet DISSOLVE 1 TABLET ON THE TONGUE THREE TIMES DAILY AS NEEDED 5/10/21   Delvin Henry MD   promethazine (PHENERGAN) 12.5 MG tablet Take 25 mg by mouth Every 8 (Eight) Hours As Needed. 5/20/21   Delvin Henry MD REPATHA SURECLICK 140 MG/ML solution auto-injector Inject 140 mg under the skin into the appropriate area as directed Every 14 (Fourteen) Days. 4/11/19   Delvin Henry MD   ubrogepant 100 MG tablet 100 mg As Needed. 6/1/21   Delvin Henry MD   venlafaxine XR (EFFEXOR-XR) 37.5 MG 24 hr capsule Take 37.5 mg by mouth Every Night. 4/8/22   Provider, MD Delvin   venlafaxine XR (EFFEXOR-XR) 75 MG 24 hr capsule 75 mg Every Morning. 6/27/19    Provider, Delvin, MD          Objective   Physical Exam  Constitutional is a 74-year-old awake alert no acute distress triage vital signs are been reviewed.  HEENT no obvious trauma extraocular muscles intact pupils equal and reactive no raccoon or Howell sign.  Back no cervical thoracic lumbar spine tenderness or posterior rib tenderness trachea midline chest wall without tenderness good breath sounds bilaterally heart regular without murmur rub abdomen soft nontender good bowel sounds no bruising extremities arms and legs full range of motion no obvious deformities no shortening rotation she has mild pain to the left lateral hip and buttock area but no step-off or deformity no lumbar spine tenderness no pain to the anterior lower abdomen or pelvis.  I can move the hip through full range of motion.  No shortening rotation toes on the including big toe dorsiflex plantarflex without difficulty pulses equal in lower extremities no edema cords or Homans' sign or evidence of DVT arms and other leg without pain or deformities neurologic awake alert and orientated x 4 with a Elly Coma Scale 15  Procedures           ED Course        CT Pelvis Without Contrast  Result Date: 4/2/2025  Impression: No acute osseous abnormality. Corticated defect in the left iliac bone, which may represent sequelae of prior trauma or sequelae of prior surgery. If there is persistent clinical concern, MR can be obtained. Electronically Signed: Michael Byrnes  4/2/2025 3:41 PM EDT  Workstation ID: [id number]    XR Hip With or Without Pelvis 2 - 3 View Left  Result Date: 4/2/2025  Impression: 1.No acute osseous abnormality of the left hip. 2.Degenerative disc height loss and facet arthropathy of the lower lumbar spine. Electronically Signed: Yaya Maravilla  4/2/2025 3:06 PM EDT  Workstation ID: [id number]    Medications   sodium chloride 0.9 % flush 10 mL (has no administration in time range)   morphine injection 2 mg (2 mg Intravenous Given  4/2/25 1540)   ondansetron (ZOFRAN) injection 4 mg (4 mg Intravenous Given 4/2/25 1541)                                                        Medical Decision Making  Medical Decision Making.  IV established monitor placement review of sinus rhythm given morphine 2 IV Zofran 4 IV x-rays obtained my independent review do not see any fracture dislocation radiology review without acute findings.  No obvious fracture degenerative disc height loss.  The patient underwent a CT of pelvis without contrast independent review female was fracture dislocation acute abnormalities radiology review no acute osseous abnormality corticated defect in the left iliac bone which may represent sequela of prior trauma or sequelae of prior surgery.  No other acute findings.  Patient repeat examination and 3:05 PM was resting comfortably though much better she is able to stand up and walk without difficulty.  I do not see any evidence of a fracture in her hip.  Seems dislocation noted no pain throughout the lumbar spine do not see a pelvic fracture.  I do not see any evidence that would suggest any vascular injury or DVT or infection or septic joint there is no redness or swelling to the joint.  Not a complete list of all possibilities with a subcu discharge back to the assisted living we talked about what to return for stable otherwise unremarkable improved ER course    Problems Addressed:  Contusion of left hip, initial encounter: complicated acute illness or injury  Fall, initial encounter: complicated acute illness or injury    Amount and/or Complexity of Data Reviewed  Radiology: ordered and independent interpretation performed. Decision-making details documented in ED Course.    Risk  Prescription drug management.  Parenteral controlled substances.        Final diagnoses:   Fall, initial encounter   Contusion of left hip, initial encounter       ED Disposition  ED Disposition       ED Disposition   Discharge    Condition   Stable     Comment   --               Arcelia Rasheed, APRN  [address] [facility] [place] [address]  [phone]    In 1 week           Medication List      No changes were made to your prescriptions during this visit.            Peter Anne MD  04/02/25 [id number]

## (undated) DEVICE — DRILL BIT: Brand: MICA

## (undated) DEVICE — GLV SURG BIOGEL M LTX PF 7 1/2

## (undated) DEVICE — COUNTERSINK: Brand: DART-FIRE

## (undated) DEVICE — KT SURG TURNOVER 050

## (undated) DEVICE — MICRO SAGITTAL BLADE (9.5 X 0.4 X 25.5MM)

## (undated) DEVICE — SOLUTION,WATER,IRRIGATION,1000ML,STERILE: Brand: MEDLINE

## (undated) DEVICE — CUSTOM PACK: Brand: UNBRANDED

## (undated) DEVICE — SPNG GZ WOVN 4X4IN 12PLY 10/BX STRL

## (undated) DEVICE — HEX DRIVER: Brand: MICA

## (undated) DEVICE — PK EXTREM 50

## (undated) DEVICE — STAR 15 DRIVER: Brand: DART-FIRE

## (undated) DEVICE — DRILL BIT

## (undated) DEVICE — BNDG ELAS ELITE V/CLOSE 6IN 5YD LF STRL

## (undated) DEVICE — SUT ETHLN 3/0 FS1 30IN 669H

## (undated) DEVICE — NDL HYPO PRECISIONGLIDE/REG 18G 1IN PNK

## (undated) DEVICE — Device

## (undated) DEVICE — DECANTER: Brand: UNBRANDED

## (undated) DEVICE — UNDYED BRAIDED (POLYGLACTIN 910), SYNTHETIC ABSORBABLE SUTURE: Brand: COATED VICRYL

## (undated) DEVICE — DRIVER STAR 15 SELF RETAINING: Brand: ORTHOLOC™ 3DI

## (undated) DEVICE — DRILL, HAMMERTOE UNIVERSAL SET

## (undated) DEVICE — UNDERGLV SURG BIOGEL INDICAT PI SZ8 BLU

## (undated) DEVICE — SOL IRRIG NACL 1000ML

## (undated) DEVICE — GOWN,REINFORCE,POLY,SIRUS,BREATH SLV,XLG: Brand: MEDLINE

## (undated) DEVICE — K-WIRE BLUNT/TROCAR
Type: IMPLANTABLE DEVICE | Site: FOOT | Status: NON-FUNCTIONAL
Removed: 2022-04-25

## (undated) DEVICE — DRILL BIT: Brand: DART-FIRE

## (undated) DEVICE — K-WIRE BLUNT/TROCAR
Type: IMPLANTABLE DEVICE | Site: FOOT | Status: NON-FUNCTIONAL
Removed: 2021-06-28

## (undated) DEVICE — SUT VIC 3/0 SH 27IN J416H

## (undated) DEVICE — C2 DRIVER W/ TROCAR: Brand: PRO-TOE

## (undated) DEVICE — SLV SCD CALF HEMOFORCE DVT THERP REPROC MD

## (undated) DEVICE — SUT ETHLN 3/0 PS1 18IN 1663H

## (undated) DEVICE — ADHS LIQ MASTISOL 2/3ML

## (undated) DEVICE — SUT VIC 2/0 CT2 27IN J269H

## (undated) DEVICE — CONCAVE SURFACING REAMER

## (undated) DEVICE — PAD UNDERCAST WYTEX 4IN 4YD LF STRL

## (undated) DEVICE — CUFF TOURNI 1BLADDER 1PRT 18IN STRL

## (undated) DEVICE — SINGLE TROCAR WIRE
Type: IMPLANTABLE DEVICE | Site: FOOT | Status: NON-FUNCTIONAL
Brand: CHARLOTTE
Removed: 2022-04-25

## (undated) DEVICE — SUT ETHLN 4/0 PS2 18IN 1667H

## (undated) DEVICE — OCCLUSIVE GAUZE STRIP OVERWRAP,3% BISMUTH TRIBROMOPHENATE IN PETROLATUM BLEND: Brand: XEROFORM

## (undated) DEVICE — GRD PIN WHT 0.45

## (undated) DEVICE — PENCL HND ROCKRSWTCH HOLSTR EZ CLEAN TP CRD 10FT

## (undated) DEVICE — BURR: Brand: MICA

## (undated) DEVICE — GAUZE,SPONGE,FLUFF,6"X6.75",STRL,5/TRAY: Brand: MEDLINE

## (undated) DEVICE — WEDGE BURR: Brand: MICA

## (undated) DEVICE — 3M™ STERI-STRIP™ REINFORCED ADHESIVE SKIN CLOSURES, R1541, 1/4 IN X 3 IN (6 MM X 75 MM), 3 STRIPS/ENVELOPE: Brand: 3M™ STERI-STRIP™

## (undated) DEVICE — INTENDED FOR TISSUE SEPARATION, AND OTHER PROCEDURES THAT REQUIRE A SHARP SURGICAL BLADE TO PUNCTURE OR CUT.: Brand: BARD-PARKER ® CARBON RIB-BACK BLADES

## (undated) DEVICE — BNDG ESMARK 4IN 12FT LF STRL BLU

## (undated) DEVICE — FIRST MET TRANSLATOR: Brand: PROSTEP MICA

## (undated) DEVICE — INST GAUGE DEPTH PROSTEP MICA STRL 1P/U

## (undated) DEVICE — SUT VIC 3/0 CT2 27IN J232H

## (undated) DEVICE — 1010 S-DRAPE TOWEL DRAPE 10/BX: Brand: STERI-DRAPE™

## (undated) DEVICE — BANDAGE,GAUZE,BULKEE II,4.5"X4.1YD,STRL: Brand: MEDLINE

## (undated) DEVICE — VO DRILL: Brand: PRO-TOE

## (undated) DEVICE — APPL CHLORAPREP HI/LITE TINTED 10.5ML ORNG

## (undated) DEVICE — CONVEX REAMER

## (undated) DEVICE — DRAPE,U/ SHT,SPLIT,PLAS,STERIL: Brand: MEDLINE

## (undated) DEVICE — MIS STERILE INSTRUMENT PACK W/ BLADE: Brand: PROSTEP™

## (undated) DEVICE — BNDG ELAS MATRX V/CLS 4IN 5YD LF